# Patient Record
Sex: FEMALE | Race: WHITE | Employment: FULL TIME | URBAN - METROPOLITAN AREA
[De-identification: names, ages, dates, MRNs, and addresses within clinical notes are randomized per-mention and may not be internally consistent; named-entity substitution may affect disease eponyms.]

---
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
## 2021-08-11 ENCOUNTER — HOSPITAL ENCOUNTER (OUTPATIENT)
Dept: LAB | Age: 21
Discharge: HOME | End: 2021-08-11
Payer: COMMERCIAL

## 2021-08-11 ENCOUNTER — OFFICE VISIT (OUTPATIENT)
Dept: FAMILY MEDICINE CLINIC | Age: 21
End: 2021-08-11
Payer: COMMERCIAL

## 2021-08-11 VITALS
BODY MASS INDEX: 20.77 KG/M2 | WEIGHT: 110 LBS | HEART RATE: 96 BPM | SYSTOLIC BLOOD PRESSURE: 103 MMHG | OXYGEN SATURATION: 98 % | HEIGHT: 61 IN | RESPIRATION RATE: 20 BRPM | DIASTOLIC BLOOD PRESSURE: 69 MMHG | TEMPERATURE: 98.3 F

## 2021-08-11 DIAGNOSIS — R78.79 HIGH BLOOD COPPER LEVEL: Primary | ICD-10-CM

## 2021-08-11 DIAGNOSIS — R78.79 HIGH BLOOD COPPER LEVEL: ICD-10-CM

## 2021-08-11 DIAGNOSIS — Z76.89 ENCOUNTER TO ESTABLISH CARE: ICD-10-CM

## 2021-08-11 PROCEDURE — 99214 OFFICE O/P EST MOD 30 MIN: CPT | Performed by: STUDENT IN AN ORGANIZED HEALTH CARE EDUCATION/TRAINING PROGRAM

## 2021-08-11 PROCEDURE — 36415 COLL VENOUS BLD VENIPUNCTURE: CPT

## 2021-08-11 PROCEDURE — 82525 ASSAY OF COPPER: CPT

## 2021-08-11 PROCEDURE — 82390 ASSAY OF CERULOPLASMIN: CPT

## 2021-08-11 NOTE — PROGRESS NOTES
Ninoska Davis presents today for   Chief Complaint   Patient presents with   [facility].       Is someone accompanying this pt? yes    Is the patient using any DME equipment during OV? no    Depression Screening:  3 most recent PHQ Screens 8/11/2021   Little interest or pleasure in doing things Not at all   Feeling down, depressed, irritable, or hopeless Not at all   Total Score PHQ 2 0   In the past year have you felt depressed or sad most days, even if you felt okay? -   Has there been a time in the past month when you have had serious thoughts about ending your life? -   Have you ever in your whole life, tried to kill yourself or made a suicide attempt? -       Learning Assessment:  Learning Assessment 7/26/2018   PRIMARY LEARNER Patient   HIGHEST LEVEL OF EDUCATION - PRIMARY LEARNER  DID NOT GRADUATE HIGH SCHOOL   BARRIERS PRIMARY LEARNER NONE   PRIMARY LANGUAGE ENGLISH   LEARNER PREFERENCE PRIMARY LISTENING     READING     DEMONSTRATION   ANSWERED BY patient   RELATIONSHIP SELF       Abuse Screening:  Abuse Screening Questionnaire 7/26/2018   Do you ever feel afraid of your partner? N   Are you in a relationship with someone who physically or mentally threatens you? N   Is it safe for you to go home? Y       Fall Risk  Fall Risk Assessment, last 12 mths 7/26/2018   Able to walk? Yes   Fall in past 12 months? No       Health Maintenance reviewed and discussed and ordered per Provider. Health Maintenance Due   Topic Date Due    Hepatitis C Screening  Never done    HPV Age 9Y-34Y (1 - 2-dose series) Never done    COVID-19 Vaccine (1) Never done   . Coordination of Care:  1. Have you been to the ER, urgent care clinic since your last visit? Hospitalized since your last visit? no    2. Have you seen or consulted any other health care providers outside of the [address] [place] [address] since your last visit? Include any pap smears or colon screening.  yes      Last  Checked na  Last UDS Checked na  Last Pain contract signed: [email or web address]

## 2021-08-12 LAB — CERULOPLASMIN SERPL-MCNC: 40.1 MG/DL (ref 19–39)

## 2021-08-13 NOTE — PROGRESS NOTES
Valentin Vasquez is a 22 y.o.  female and presents with    Chief Complaint   Patient presents with    Establish Care           Subjective:    Patient is here to establish care. She has been followed by Dr. Deyanira Tolbert, due to tremors. Blood work was ran on patient, and it was noted that ceruplasmin and copper levels were elevated. She was started on zinc therapy. Patient was seeing neurology because of essential tremors. Patient has had blood work done by neurology, is within normal limits liver enzymes, normal lipase, has not done 24-hour urine copper levels. Has not seen ophthalmology. There is no family history of anyone having issues with copper metabolism. Father did die from liver failure. 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    Family History   Problem Relation Age of Onset    Asthma Mother      Social History     Socioeconomic History    Marital status: SINGLE     Spouse name: Not on file    Number of children: Not on file    Years of education: Not on file    Highest education level: Not on file   Occupational History    Not on file   Tobacco Use    Smoking status: Never Smoker    Smokeless tobacco: Never Used   Substance and Sexual Activity    Alcohol use: No    Drug use: No    Sexual activity: Yes     Partners: Male     Birth control/protection: Pill   Other Topics Concern     Service No    Blood Transfusions No    Caffeine Concern No    Occupational Exposure No    Hobby Hazards No    Sleep Concern No    Stress Concern No    Weight Concern No    Special Diet No    Back Care No    Exercise Yes    Bike Helmet No    Seat Belt Yes    Self-Exams Yes   Social History Narrative    Not on file     Social Determinants of Health     Financial Resource Strain:     Difficulty of Paying Living Expenses:    Food Insecurity:     Worried About Running Out of Food in the Last Year:     [address] in the Last Year:    Transportation Needs:     Lack of Transportation (Medical):  Lack of Transportation (Non-Medical):    Physical Activity:     Days of Exercise per Week:     Minutes of Exercise per Session:    Stress:     Feeling of Stress :    Social Connections:     Frequency of Communication with Friends and Family:     Frequency of Social Gatherings with Friends and Family:     Attends Zoroastrianism Services:     Active Member of Clubs or Organizations:     Attends Club or Organization Meetings:     Marital Status:    Intimate Partner Violence:     Fear of Current or Ex-Partner:     Emotionally Abused:     Physically Abused:     Sexually Abused:              ROS   Review of Systems   Constitutional: Negative for chills, fever and malaise/fatigue. HENT: Negative for congestion, ear discharge and ear pain. Eyes: Negative for blurred vision, pain and discharge. Respiratory: Negative for cough and shortness of breath. Cardiovascular: Negative for chest pain and palpitations. Gastrointestinal: Negative for abdominal pain, nausea and vomiting. Genitourinary: Negative for dysuria, frequency and urgency. Skin: Negative for itching and rash. Neurological: Negative for dizziness, seizures, loss of consciousness and headaches. Psychiatric/Behavioral: Negative for substance abuse.            Objective:  Vitals:    08/11/21 0835   BP: 103/69   Pulse: 96   Resp: 20   Temp: 98.3 °F (36.8 °C)   SpO2: 98%   Weight: 110 lb (49.9 kg)   Height: 5' 1\" (1.549 m)   PainSc:   0 - No pain   LMP: 08/07/2021       Physical Exam  Vitals reviewed. Constitutional:       Appearance: Normal appearance. HENT:      Right Ear: Tympanic membrane, ear canal and external ear normal. There is no impacted cerumen. Left Ear: Tympanic membrane, ear canal and external ear normal. There is no impacted cerumen. Nose: Nose normal. No congestion or rhinorrhea. Mouth/Throat:      Mouth: Mucous membranes are dry. Pharynx: Oropharynx is clear. No oropharyngeal exudate or posterior oropharyngeal erythema. Eyes:      General: No scleral icterus. Right eye: No discharge. Left eye: No discharge. Extraocular Movements: Extraocular movements intact. Comments: No Kayser-Fleischer rings appreciated   Cardiovascular:      Rate and Rhythm: Normal rate and regular rhythm. Heart sounds: No murmur heard. Pulmonary:      Effort: Pulmonary effort is normal. No respiratory distress. Breath sounds: Normal breath sounds. No stridor. No wheezing, rhonchi or rales. Chest:      Chest wall: No tenderness. Abdominal:      General: Abdomen is flat. Bowel sounds are normal.      Palpations: Abdomen is soft. Tenderness: There is no abdominal tenderness. Musculoskeletal:         General: No swelling, tenderness, deformity or signs of injury. Normal range of motion. Cervical back: Normal range of motion and neck supple. Right lower leg: No edema. Left lower leg: No edema. Lymphadenopathy:      Cervical: No cervical adenopathy. Skin:     General: Skin is warm and dry. Neurological:      Mental Status: She is alert and oriented to person, place, and time. Cranial Nerves: No cranial nerve deficit. Deep Tendon Reflexes: Reflexes normal.   Psychiatric:         Mood and Affect: Mood normal.         Behavior: Behavior normal.         Thought Content: Thought content normal.           LABS     TESTS      Assessment/Plan:    1.  High blood copper level  Will get copper antiplasmin level, patient is to do copper urine 24-hour collection as well. - REFERRAL TO OPHTHALMOLOGY  - COPPER; Future  - CERULOPLASMIN; Future    2. Encounter to establish care  Will be patient's PCP       Lab review: orders written for new lab studies as appropriate; see orders      I have discussed the diagnosis with the patient and the intended plan as seen in the above orders. The patient has received an after-visit summary and questions were answered concerning future plans. I have discussed medication side effects and warnings with the patient as well. I have reviewed the plan of care with the patient, accepted their input and they are in agreement with the treatment goals.          Maycol Harris MD

## 2021-08-14 LAB — COPPER SERPL-MCNC: 213 UG/DL (ref 80–158)

## 2021-08-15 NOTE — PROGRESS NOTES
Please call patient and let her know that her copper and ceruplasmin levels are lower but are still elevated. Ask her to please do the 24hr urine copper and turn it in. To please call and let us know when she does it.  thanks

## 2021-08-16 ENCOUNTER — TELEPHONE (OUTPATIENT)
Dept: FAMILY MEDICINE CLINIC | Age: 21
End: 2021-08-16

## 2021-08-16 NOTE — TELEPHONE ENCOUNTER
Called patient told lab results and to please do the copper test. Patient states she will do test tomorrow.

## 2021-09-09 DIAGNOSIS — R78.79 HIGH BLOOD COPPER LEVEL: Primary | ICD-10-CM

## 2021-09-09 NOTE — PROGRESS NOTES
Discussed w/ pt that she had normal urine copper level. Burak Mast ruled out. Need to look for further dx. She is to come and have blood work done, and then have a follow up appt. She verbalized understanding.

## 2021-09-20 ENCOUNTER — HOSPITAL ENCOUNTER (OUTPATIENT)
Dept: LAB | Age: 21
Discharge: HOME | End: 2021-09-20
Payer: COMMERCIAL

## 2021-09-20 DIAGNOSIS — R78.79 HIGH BLOOD COPPER LEVEL: ICD-10-CM

## 2021-09-20 LAB
ANION GAP SERPL CALC-SCNC: 7 MMOL/L (ref 3–18)
BASOPHILS # BLD: 0 K/UL (ref 0–0.1)
BASOPHILS NFR BLD: 0 % (ref 0–2)
BUN SERPL-MCNC: 10 MG/DL (ref 7–18)
BUN/CREAT SERPL: 15 (ref 12–20)
CALCIUM SERPL-MCNC: 8.7 MG/DL (ref 8.5–10.1)
CHLORIDE SERPL-SCNC: 108 MMOL/L (ref 100–111)
CO2 SERPL-SCNC: 24 MMOL/L (ref 21–32)
CREAT SERPL-MCNC: 0.66 MG/DL (ref 0.6–1.3)
DIFFERENTIAL METHOD BLD: ABNORMAL
EOSINOPHIL # BLD: 0.3 K/UL (ref 0–0.4)
EOSINOPHIL NFR BLD: 4 % (ref 0–5)
ERYTHROCYTE [DISTWIDTH] IN BLOOD BY AUTOMATED COUNT: 13.7 % (ref 11.6–14.5)
GLUCOSE SERPL-MCNC: 72 MG/DL (ref 74–99)
HCT VFR BLD AUTO: 38.5 % (ref 35–45)
HGB BLD-MCNC: 12.6 G/DL (ref 12–16)
LYMPHOCYTES # BLD: 2.7 K/UL (ref 0.9–3.6)
LYMPHOCYTES NFR BLD: 37 % (ref 21–52)
MCH RBC QN AUTO: 30.7 PG (ref 24–34)
MCHC RBC AUTO-ENTMCNC: 32.7 G/DL (ref 31–37)
MCV RBC AUTO: 93.9 FL (ref 78–100)
MONOCYTES # BLD: 0.4 K/UL (ref 0.05–1.2)
MONOCYTES NFR BLD: 5 % (ref 3–10)
NEUTS SEG # BLD: 3.9 K/UL (ref 1.8–8)
NEUTS SEG NFR BLD: 53 % (ref 40–73)
PLATELET # BLD AUTO: 299 K/UL (ref 135–420)
PMV BLD AUTO: 9.8 FL (ref 9.2–11.8)
POTASSIUM SERPL-SCNC: 4.1 MMOL/L (ref 3.5–5.5)
RBC # BLD AUTO: 4.1 M/UL (ref 4.2–5.3)
SODIUM SERPL-SCNC: 139 MMOL/L (ref 136–145)
WBC # BLD AUTO: 7.3 K/UL (ref 4.6–13.2)

## 2021-09-20 PROCEDURE — 36415 COLL VENOUS BLD VENIPUNCTURE: CPT

## 2021-09-20 PROCEDURE — 82525 ASSAY OF COPPER: CPT

## 2021-09-20 PROCEDURE — 86200 CCP ANTIBODY: CPT

## 2021-09-20 PROCEDURE — 82390 ASSAY OF CERULOPLASMIN: CPT

## 2021-09-20 PROCEDURE — 85025 COMPLETE CBC W/AUTO DIFF WBC: CPT

## 2021-09-20 PROCEDURE — 80048 BASIC METABOLIC PNL TOTAL CA: CPT

## 2021-09-20 PROCEDURE — 84630 ASSAY OF ZINC: CPT

## 2021-09-21 LAB — CERULOPLASMIN SERPL-MCNC: 43.6 MG/DL (ref 19–39)

## 2021-09-26 LAB
COPPER SERPL-MCNC: 222 UG/DL (ref 80–158)
ZINC SERPL-MCNC: 89 UG/DL (ref 44–115)

## 2021-09-30 LAB
14.3.3 ETA, RHEUM. ARTHRITIS: <0.2 NG/ML
CCP IGA+IGG SERPL IA-ACNC: <20 UNITS
RHEUMATOID FACT SERPL-ACNC: <14 UNITS/ML

## 2021-10-22 ENCOUNTER — TELEPHONE (OUTPATIENT)
Dept: FAMILY MEDICINE CLINIC | Age: 21
End: 2021-10-22

## 2021-12-01 ENCOUNTER — OFFICE VISIT (OUTPATIENT)
Dept: FAMILY MEDICINE CLINIC | Age: 21
End: 2021-12-01
Payer: COMMERCIAL

## 2021-12-01 ENCOUNTER — HOSPITAL ENCOUNTER (OUTPATIENT)
Dept: LAB | Age: 21
Discharge: HOME | End: 2021-12-01
Payer: COMMERCIAL

## 2021-12-01 VITALS
RESPIRATION RATE: 20 BRPM | OXYGEN SATURATION: 100 % | WEIGHT: 117.4 LBS | BODY MASS INDEX: 22.18 KG/M2 | DIASTOLIC BLOOD PRESSURE: 75 MMHG | HEART RATE: 82 BPM | SYSTOLIC BLOOD PRESSURE: 108 MMHG | TEMPERATURE: 97.6 F

## 2021-12-01 DIAGNOSIS — R78.79 HIGH BLOOD COPPER LEVEL: ICD-10-CM

## 2021-12-01 DIAGNOSIS — R78.79 HIGH BLOOD COPPER LEVEL: Primary | ICD-10-CM

## 2021-12-01 LAB
ALBUMIN SERPL-MCNC: 3.6 G/DL (ref 3.4–5)
ALBUMIN/GLOB SERPL: 0.9 {RATIO} (ref 0.8–1.7)
ALP SERPL-CCNC: 53 U/L (ref 45–117)
ALT SERPL-CCNC: 13 U/L (ref 13–56)
ANION GAP SERPL CALC-SCNC: 3 MMOL/L (ref 3–18)
AST SERPL-CCNC: 12 U/L (ref 10–38)
BASOPHILS # BLD: 0 K/UL (ref 0–0.1)
BASOPHILS NFR BLD: 0 % (ref 0–2)
BILIRUB SERPL-MCNC: 0.4 MG/DL (ref 0.2–1)
BUN SERPL-MCNC: 13 MG/DL (ref 7–18)
BUN/CREAT SERPL: 22 (ref 12–20)
CALCIUM SERPL-MCNC: 9.1 MG/DL (ref 8.5–10.1)
CHLORIDE SERPL-SCNC: 105 MMOL/L (ref 100–111)
CO2 SERPL-SCNC: 29 MMOL/L (ref 21–32)
CREAT SERPL-MCNC: 0.58 MG/DL (ref 0.6–1.3)
DIFFERENTIAL METHOD BLD: NORMAL
EOSINOPHIL # BLD: 0.3 K/UL (ref 0–0.4)
EOSINOPHIL NFR BLD: 4 % (ref 0–5)
ERYTHROCYTE [DISTWIDTH] IN BLOOD BY AUTOMATED COUNT: 13.4 % (ref 11.6–14.5)
GLOBULIN SER CALC-MCNC: 4.1 G/DL (ref 2–4)
GLUCOSE SERPL-MCNC: 76 MG/DL (ref 74–99)
HCT VFR BLD AUTO: 40.9 % (ref 35–45)
HGB BLD-MCNC: 13.1 G/DL (ref 12–16)
IMM GRANULOCYTES # BLD AUTO: 0 K/UL (ref 0–0.04)
IMM GRANULOCYTES NFR BLD AUTO: 0 % (ref 0–0.5)
LYMPHOCYTES # BLD: 2.2 K/UL (ref 0.9–3.6)
LYMPHOCYTES NFR BLD: 27 % (ref 21–52)
MCH RBC QN AUTO: 30 PG (ref 24–34)
MCHC RBC AUTO-ENTMCNC: 32 G/DL (ref 31–37)
MCV RBC AUTO: 93.6 FL (ref 78–100)
MONOCYTES # BLD: 0.5 K/UL (ref 0.05–1.2)
MONOCYTES NFR BLD: 7 % (ref 3–10)
NEUTS SEG # BLD: 4.9 K/UL (ref 1.8–8)
NEUTS SEG NFR BLD: 61 % (ref 40–73)
NRBC # BLD: 0 K/UL (ref 0–0.01)
NRBC BLD-RTO: 0 PER 100 WBC
PLATELET # BLD AUTO: 315 K/UL (ref 135–420)
PMV BLD AUTO: 9.8 FL (ref 9.2–11.8)
POTASSIUM SERPL-SCNC: 4.1 MMOL/L (ref 3.5–5.5)
PROT SERPL-MCNC: 7.7 G/DL (ref 6.4–8.2)
RBC # BLD AUTO: 4.37 M/UL (ref 4.2–5.3)
SODIUM SERPL-SCNC: 137 MMOL/L (ref 136–145)
WBC # BLD AUTO: 8.1 K/UL (ref 4.6–13.2)

## 2021-12-01 PROCEDURE — 82525 ASSAY OF COPPER: CPT

## 2021-12-01 PROCEDURE — 36415 COLL VENOUS BLD VENIPUNCTURE: CPT

## 2021-12-01 PROCEDURE — 80053 COMPREHEN METABOLIC PANEL: CPT

## 2021-12-01 PROCEDURE — 82390 ASSAY OF CERULOPLASMIN: CPT

## 2021-12-01 PROCEDURE — 99213 OFFICE O/P EST LOW 20 MIN: CPT | Performed by: STUDENT IN AN ORGANIZED HEALTH CARE EDUCATION/TRAINING PROGRAM

## 2021-12-01 PROCEDURE — 85025 COMPLETE CBC W/AUTO DIFF WBC: CPT

## 2021-12-01 NOTE — PROGRESS NOTES
Lizette Reddy is a 22 y.o. female and presents with    Chief Complaint   Patient presents with    Follow-up           Subjective:    Pt is here to f/u on her copper levels. States she continues to take her zinc, when she remembers. She has not seen neurology in a while. States that her HA lately have been better, but a couple of weeks ago they were significant. No other changes per pt. 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    Family History   Problem Relation Age of Onset    Asthma Mother      Social History     Socioeconomic History    Marital status: SINGLE     Spouse name: Not on file    Number of children: Not on file    Years of education: Not on file    Highest education level: Not on file   Occupational History    Not on file   Tobacco Use    Smoking status: Never Smoker    Smokeless tobacco: Never Used   Substance and Sexual Activity    Alcohol use: No    Drug use: No    Sexual activity: Yes     Partners: Male     Birth control/protection: Pill   Other Topics Concern     Service No    Blood Transfusions No    Caffeine Concern No    Occupational Exposure No    Hobby Hazards No    Sleep Concern No    Stress Concern No    Weight Concern No    Special Diet No    Back Care No    Exercise Yes    Bike Helmet No    Seat Belt Yes    Self-Exams Yes   Social History Narrative    Not on file     Social Determinants of Health     Financial Resource Strain:     Difficulty of Paying Living Expenses: Not on file   Food Insecurity:     Worried About Running Out of Food in the Last Year: Not on file    Ran Out of Food in the Last Year: Not on file   Transportation Needs:     Lack of Transportation (Medical): Not on file    Lack of Transportation (Non-Medical): Not on file   Physical Activity:     Days of Exercise per Week: Not on file    Minutes of Exercise per Session: Not on file   Stress:     Feeling of Stress : Not on file   Social Connections:     Frequency of Communication with Friends and Family: Not on file    Frequency of Social Gatherings with Friends and Family: Not on file    Attends Quaker Services: Not on file    Active Member of [address] [place] [address] or Organizations: Not on file    Attends Club or Organization Meetings: Not on file    Marital Status: Not on file   Intimate Partner Violence:     Fear of Current or Ex-Partner: Not on file    Emotionally Abused: Not on file    Physically Abused: Not on file    Sexually Abused: Not on file   Housing Stability:     Unable to Pay for Housing in the Last Year: Not on file    Number of [place] in the Last Year: Not on file    Unstable Housing in the Last Year: Not on file             ROS   Review of Systems   Constitutional: Negative for chills, fever and malaise/fatigue. HENT: Negative for congestion, ear discharge and ear pain. Eyes: Negative for blurred vision, pain and discharge. Respiratory: Negative for cough and shortness of breath. Cardiovascular: Negative for chest pain and palpitations. Gastrointestinal: Negative for abdominal pain, nausea and vomiting. Genitourinary: Negative for dysuria, frequency and urgency. Skin: Negative for itching and rash. Neurological: Positive for headaches. Negative for dizziness, seizures and loss of consciousness. Psychiatric/Behavioral: Negative for substance abuse.            Objective:  Vitals:    12/01/21 0810   BP: 108/75   Pulse: 82   Resp: 20   Temp: 97.6 °F (36.4 °C)   SpO2: 100%   Weight: 117 lb 6.4 oz (53.3 kg) PainSc:   0 - No pain   LMP: 11/29/2021       Physical Exam  Vitals reviewed. Constitutional:       Appearance: Normal appearance. Eyes:      General: No scleral icterus. Right eye: No discharge. Left eye: No discharge. Cardiovascular:      Rate and Rhythm: Normal rate and regular rhythm. Heart sounds: Murmur heard. Pulmonary:      Effort: Pulmonary effort is normal. No respiratory distress. Breath sounds: Normal breath sounds. Abdominal:      General: Abdomen is flat. There is no distension. Palpations: Abdomen is soft. Tenderness: There is abdominal tenderness (RUQ). Musculoskeletal:         General: Normal range of motion. Cervical back: Normal range of motion and neck supple. Skin:     General: Skin is warm and dry. Neurological:      Mental Status: She is alert and oriented to person, place, and time. Cranial Nerves: No cranial nerve deficit. Psychiatric:         Mood and Affect: Mood normal.         Behavior: Behavior normal.         Thought Content: Thought content normal.         Judgment: Judgment normal.           LABS     TESTS      Assessment/Plan:    1. High blood copper level  Called hematology to see if they would follow patient, however, since there is no significant change in her CBC it is not something they would follow. - ECHO ADULT COMPLETE; Future  - CERULOPLASMIN; Future  - COPPER; Future  - METABOLIC PANEL, COMPREHENSIVE; Future  - CBC WITH AUTOMATED DIFF; Future  -  ABD LTD; Future       Lab review: orders written for new lab studies as appropriate; see orders      I have discussed the diagnosis with the patient and the intended plan as seen in the above orders. The patient has received an after-visit summary and questions were answered concerning future plans. I have discussed medication side effects and warnings with the patient as well.  I have reviewed the plan of care with the patient, accepted their input and they are in agreement with the treatment goals.          Marlee Tran MD

## 2021-12-01 NOTE — PROGRESS NOTES
Harshrolando Mckoy presents today for   Chief Complaint   Patient presents with    Follow-up       Is someone accompanying this pt? no    Is the patient using any DME equipment during OV? no    Depression Screening:  3 most recent PHQ Screens 12/1/2021   Little interest or pleasure in doing things Not at all   Feeling down, depressed, irritable, or hopeless Not at all   Total Score PHQ 2 0   In the past year have you felt depressed or sad most days, even if you felt okay? -   Has there been a time in the past month when you have had serious thoughts about ending your life? -   Have you ever in your whole life, tried to kill yourself or made a suicide attempt? -       Learning Assessment:  Learning Assessment 7/26/2018   PRIMARY LEARNER Patient   HIGHEST LEVEL OF EDUCATION - PRIMARY LEARNER  DID NOT GRADUATE HIGH SCHOOL   BARRIERS PRIMARY LEARNER NONE   PRIMARY LANGUAGE ENGLISH   LEARNER PREFERENCE PRIMARY LISTENING     READING     DEMONSTRATION   ANSWERED BY patient   RELATIONSHIP SELF       Abuse Screening:  Abuse Screening Questionnaire 8/11/2021   Do you ever feel afraid of your partner? N   Are you in a relationship with someone who physically or mentally threatens you? N   Is it safe for you to go home? Y       Fall Risk  Fall Risk Assessment, last 12 mths 7/26/2018   Able to walk? Yes   Fall in past 12 months? No       Health Maintenance reviewed and discussed and ordered per Provider. Health Maintenance Due   Topic Date Due    Hepatitis C Screening  Never done    COVID-19 Vaccine (1) Never done    HPV Age 9Y-34Y (1 - 2-dose series) Never done    Flu Vaccine (1) Never done   . Coordination of Care:  1. Have you been to the ER, urgent care clinic since your last visit? Hospitalized since your last visit? no    2. Have you seen or consulted any other health care providers outside of the [address] [place] [address] since your last visit? Include any pap smears or colon screening.  no      Last  Checked na  Last UDS Checked na  Last Pain contract signed: jefry

## 2021-12-02 LAB — CERULOPLASMIN SERPL-MCNC: 38.4 MG/DL (ref 19–39)

## 2021-12-05 LAB — COPPER SERPL-MCNC: 167 UG/DL (ref 80–158)

## 2022-03-18 PROBLEM — R41.3 MEMORY IMPAIRMENT: Status: ACTIVE | Noted: 2018-12-13

## 2022-03-19 PROBLEM — F81.9 LEARNING DISABILITY: Status: ACTIVE | Noted: 2018-07-26

## 2022-03-19 PROBLEM — N92.6 IRREGULAR MENSES: Status: ACTIVE | Noted: 2018-12-13

## 2022-05-17 ENCOUNTER — TELEPHONE (OUTPATIENT)
Dept: FAMILY MEDICINE CLINIC | Age: 22
End: 2022-05-17

## 2022-05-17 DIAGNOSIS — R78.79 HIGH BLOOD COPPER LEVEL: Primary | ICD-10-CM

## 2022-05-17 NOTE — TELEPHONE ENCOUNTER
[facility] vascular called stating they need a new order put in for patients echo. The old one .

## 2022-06-22 ENCOUNTER — TELEPHONE (OUTPATIENT)
Dept: CARDIOLOGY CLINIC | Age: 22
End: 2022-06-22

## 2022-09-19 ENCOUNTER — OFFICE VISIT (OUTPATIENT)
Dept: FAMILY MEDICINE CLINIC | Age: 22
End: 2022-09-19
Payer: MEDICAID

## 2022-09-19 VITALS
TEMPERATURE: 97.9 F | HEIGHT: 61 IN | OXYGEN SATURATION: 99 % | RESPIRATION RATE: 16 BRPM | DIASTOLIC BLOOD PRESSURE: 70 MMHG | WEIGHT: 109.8 LBS | HEART RATE: 82 BPM | BODY MASS INDEX: 20.73 KG/M2 | SYSTOLIC BLOOD PRESSURE: 105 MMHG

## 2022-09-19 DIAGNOSIS — R78.79 HIGH BLOOD COPPER LEVEL: Primary | ICD-10-CM

## 2022-09-19 DIAGNOSIS — R53.1 WEAKNESS: ICD-10-CM

## 2022-09-19 DIAGNOSIS — R25.1 OCCASIONAL TREMORS: ICD-10-CM

## 2022-09-19 DIAGNOSIS — Z23 ENCOUNTER FOR IMMUNIZATION: ICD-10-CM

## 2022-09-19 DIAGNOSIS — Z11.59 NEED FOR HEPATITIS C SCREENING TEST: ICD-10-CM

## 2022-09-19 PROCEDURE — 90686 IIV4 VACC NO PRSV 0.5 ML IM: CPT | Performed by: STUDENT IN AN ORGANIZED HEALTH CARE EDUCATION/TRAINING PROGRAM

## 2022-09-19 PROCEDURE — 99214 OFFICE O/P EST MOD 30 MIN: CPT | Performed by: STUDENT IN AN ORGANIZED HEALTH CARE EDUCATION/TRAINING PROGRAM

## 2022-09-19 NOTE — LETTER
NOTIFICATION RETURN TO WORK / SCHOOL    9/19/2022 8:12 AM    Ms. Magdiel Pineda  [address]      To Whom It May Concern:    Magdiel Pineda is currently under the care of Yina Armenta. She was seen by me in the office today. She will return to work on: 9/20/2022    If there are questions or concerns please have the patient contact our office.         Sincerely,      Kirill Segal MD

## 2022-09-19 NOTE — PROGRESS NOTES
Kathia Herbert is a 25 y.o.  female and presents with    Chief Complaint   Patient presents with    Tremors     Mostly hands Worse last month            Subjective:    Has noted that she has been having tremors, and when this happens her hands get weak. Pt states this is interfering with work, and already had an incident at work. States it was worst a week ago, but she notices that it first starting worse about 1 month ago. She had for 2 weeks it was really bad, and then got better. Tremors were happening when she went to get something, and when she grasp something then her hands would get worse. Has not seen the neurologist that was f/up with. Does not drink alcohol or do drugs. Drinks coffee seldomly. Sleeps about 7 hours a night, but if off she can sleep 15 hours. 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 No pertinent surgical history.    Family History   Problem Relation Age of Onset    Asthma Mother      Social History     Socioeconomic History    Marital status: SINGLE     Spouse name: Not on file    Number of children: Not on file    Years of education: Not on file    Highest education level: Not on file   Occupational History    Not on file   Tobacco Use    Smoking status: Never    Smokeless tobacco: Never   Substance and Sexual Activity    Alcohol use: No    Drug use: No    Sexual activity: Yes     Partners: Male     Birth control/protection: Pill   Other Topics Concern     Service No    Blood Transfusions No    Caffeine Concern No    Occupational Exposure No    Hobby Hazards No    Sleep Concern No    Stress Concern No    Weight Concern No    Special Diet No    Back Care No    Exercise Yes    Bike Helmet No    Seat Belt Yes    Self-Exams Yes   Social History Narrative    Not on file     Social Determinants of Health     Financial Resource Strain: Not on file   Food Insecurity: Not on file   Transportation Needs: Not on file   Physical Activity: Not on file   Stress: Not on file   Social Connections: Not on file   Intimate Partner Violence: Not on file   Housing Stability: Not on file             ROS   Review of Systems   Constitutional:  Negative for chills, fever and malaise/fatigue. HENT:  Negative for congestion, ear discharge and ear pain. Eyes:  Negative for blurred vision, pain and discharge. Respiratory:  Negative for cough and shortness of breath. Cardiovascular:  Negative for chest pain and palpitations. Gastrointestinal:  Negative for abdominal pain, nausea and vomiting. Genitourinary:  Negative for dysuria, frequency and urgency. Skin:  Negative for itching and rash. Neurological:  Positive for tremors. Negative for dizziness, seizures, loss of consciousness and headaches. Psychiatric/Behavioral:  Negative for substance abuse. Objective:  Vitals:    09/19/22 0800   BP: 105/70   Pulse: 82   Resp: 16   Temp: 97.9 °F (36.6 °C)   TempSrc: Temporal   SpO2: 99%   Weight: 109 lb 12.8 oz (49.8 kg)   Height: 5' 1\" (1.549 m)   PainSc:   0 - No pain   LMP: 09/05/2022       Physical Exam  Vitals reviewed. Constitutional:       Appearance: Normal appearance. Eyes:      General: No scleral icterus. Right eye: No discharge. Left eye: No discharge. Cardiovascular:      Rate and Rhythm: Normal rate and regular rhythm. Pulmonary:      Effort: Pulmonary effort is normal. No respiratory distress.    Musculoskeletal:      Cervical back: Normal range of motion and neck supple. Neurological:      Mental Status: She is alert and oriented to person, place, and time. Cranial Nerves: No cranial nerve deficit. Motor: Tremor (RUE on intention) present. Coordination: Finger-Nose-Finger Test and Heel to [employer] normal.   Psychiatric:         Mood and Affect: Mood normal.         Behavior: Behavior normal.         Thought Content: Thought content normal.         Judgment: Judgment normal.       LABS     TESTS      Assessment/Plan:    1. High blood copper level  - COPPER; Future  - CERULOPLASMIN; Future  - ZINC; Future  - US LIVER; Future    2. Occasional tremors  - METABOLIC PANEL, COMPREHENSIVE; Future  - CBC WITH AUTOMATED DIFF; Future  - TSH 3RD GENERATION; Future  - URINALYSIS W/MICROSCOPIC; Future    3. Need for hepatitis C screening test  - HEPATITIS C AB; Future    4. Encounter for immunization  - INFLUENZA, FLUARIX, FLULAVAL, FLUZONE (AGE 6 MO+), AFLURIA(AGE 3Y+) IM, PF, 0.5 ML    5. Weakness  - CK; Future      Lab review: orders written for new lab studies as appropriate; see orders    Over 30 mins spent on appt and medical management      I have discussed the diagnosis with the patient and the intended plan as seen in the above orders. The patient has received an after-visit summary and questions were answered concerning future plans. I have discussed medication side effects and warnings with the patient as well. I have reviewed the plan of care with the patient, accepted their input and they are in agreement with the treatment goals.          Caro Wiggins MD

## 2022-09-19 NOTE — PROGRESS NOTES
Дмитрий Ma is a 25 y.o. presents today for   Chief Complaint   Patient presents with    Tremors     Mostly hands Worse last month      Is someone accompanying this pt? No    Is the patient using any DME equipment during OV? No    Depression Screening:   3 most recent PHQ Screens 9/19/2022   Little interest or pleasure in doing things Not at all   Feeling down, depressed, irritable, or hopeless Not at all   Total Score PHQ 2 0   In the past year have you felt depressed or sad most days, even if you felt okay? -   Has there been a time in the past month when you have had serious thoughts about ending your life? -   Have you ever in your whole life, tried to kill yourself or made a suicide attempt? -       Health Maintenance: reviewed and discussed and ordered per Provider. Health Maintenance Due   Topic Date Due    Hepatitis C Screening  Never done    COVID-19 Vaccine (1) Never done    HPV Age 9Y-34Y (1 - 2-dose series) Never done    Pap Smear  Never done    Flu Vaccine (1) Never done         Coordination of Care:   1. \"Have you been to the ER, urgent care clinic since your last visit? Hospitalized since your last visit? \" Yes [facility] for bronchospams    2. \"Have you seen or consulted any other health care providers outside of the [address] [place] [address] since your last visit? \" No     3. For patients aged 39-70: Has the patient had a colonoscopy / FIT/ Cologuard? NA - based on age    If the patient is female:    4. For patients aged 41-77: Has the patient had a mammogram within the past 2 years? NA - based on age or sex    11. For patients aged 21-65: Has the patient had a pap smear?  Yes, last year  [address] [place] [address]

## 2022-09-21 LAB
ALBUMIN SERPL-MCNC: 4.2 G/DL (ref 3.9–5)
ALBUMIN/GLOB SERPL: 1.3 {RATIO} (ref 1.2–2.2)
ALP SERPL-CCNC: 55 IU/L (ref 44–121)
ALT SERPL-CCNC: 5 IU/L (ref 0–32)
APPEARANCE UR: CLEAR
AST SERPL-CCNC: 11 IU/L (ref 0–40)
BACTERIA #/AREA URNS HPF: NORMAL /[HPF]
BASOPHILS # BLD AUTO: 0 X10E3/UL (ref 0–0.2)
BASOPHILS NFR BLD AUTO: 0 %
BILIRUB SERPL-MCNC: 0.3 MG/DL (ref 0–1.2)
BILIRUB UR QL STRIP: NEGATIVE
BUN SERPL-MCNC: 11 MG/DL (ref 6–20)
BUN/CREAT SERPL: 19 (ref 9–23)
CALCIUM SERPL-MCNC: 9.3 MG/DL (ref 8.7–10.2)
CASTS URNS QL MICRO: NORMAL /LPF
CERULOPLASMIN SERPL-MCNC: 48.2 MG/DL (ref 19–39)
CHLORIDE SERPL-SCNC: 102 MMOL/L (ref 96–106)
CK SERPL-CCNC: 58 U/L (ref 32–182)
CO2 SERPL-SCNC: 21 MMOL/L (ref 20–29)
COLOR UR: YELLOW
COPPER SERPL-MCNC: 204 UG/DL (ref 80–158)
CREAT SERPL-MCNC: 0.59 MG/DL (ref 0.57–1)
EGFR: 131 ML/MIN/1.73
EOSINOPHIL # BLD AUTO: 0.2 X10E3/UL (ref 0–0.4)
EOSINOPHIL NFR BLD AUTO: 4 %
EPI CELLS #/AREA URNS HPF: NORMAL /HPF (ref 0–10)
ERYTHROCYTE [DISTWIDTH] IN BLOOD BY AUTOMATED COUNT: 13.5 % (ref 11.7–15.4)
GLOBULIN SER CALC-MCNC: 3.2 G/DL (ref 1.5–4.5)
GLUCOSE SERPL-MCNC: 81 MG/DL (ref 65–99)
GLUCOSE UR QL STRIP: NEGATIVE
HCT VFR BLD AUTO: 39.4 % (ref 34–46.6)
HCV AB S/CO SERPL IA: <0.1 S/CO RATIO (ref 0–0.9)
HGB BLD-MCNC: 13.1 G/DL (ref 11.1–15.9)
HGB UR QL STRIP: NEGATIVE
IMM GRANULOCYTES # BLD AUTO: 0 X10E3/UL (ref 0–0.1)
IMM GRANULOCYTES NFR BLD AUTO: 1 %
KETONES UR QL STRIP: NEGATIVE
LEUKOCYTE ESTERASE UR QL STRIP: NEGATIVE
LYMPHOCYTES # BLD AUTO: 1.8 X10E3/UL (ref 0.7–3.1)
LYMPHOCYTES NFR BLD AUTO: 33 %
MCH RBC QN AUTO: 31.6 PG (ref 26.6–33)
MCHC RBC AUTO-ENTMCNC: 33.2 G/DL (ref 31.5–35.7)
MCV RBC AUTO: 95 FL (ref 79–97)
MICRO URNS: NORMAL
MICRO URNS: NORMAL
MONOCYTES # BLD AUTO: 0.4 X10E3/UL (ref 0.1–0.9)
MONOCYTES NFR BLD AUTO: 8 %
NEUTROPHILS # BLD AUTO: 2.9 X10E3/UL (ref 1.4–7)
NEUTROPHILS NFR BLD AUTO: 54 %
NITRITE UR QL STRIP: NEGATIVE
PH UR STRIP: 5.5 [PH] (ref 5–7.5)
PLATELET # BLD AUTO: 361 X10E3/UL (ref 150–450)
POTASSIUM SERPL-SCNC: 4.1 MMOL/L (ref 3.5–5.2)
PROT SERPL-MCNC: 7.4 G/DL (ref 6–8.5)
PROT UR QL STRIP: NEGATIVE
RBC # BLD AUTO: 4.14 X10E6/UL (ref 3.77–5.28)
RBC #/AREA URNS HPF: NORMAL /HPF (ref 0–2)
SODIUM SERPL-SCNC: 140 MMOL/L (ref 134–144)
SP GR UR STRIP: 1.03 (ref 1–1.03)
TSH SERPL DL<=0.005 MIU/L-ACNC: 1.64 UIU/ML (ref 0.45–4.5)
UROBILINOGEN UR STRIP-MCNC: 0.2 MG/DL (ref 0.2–1)
WBC # BLD AUTO: 5.4 X10E3/UL (ref 3.4–10.8)
WBC #/AREA URNS HPF: NORMAL /HPF (ref 0–5)
ZINC SERPL-MCNC: 77 UG/DL (ref 44–115)

## 2022-10-01 DIAGNOSIS — M67.40 GANGLION CYST: Primary | ICD-10-CM

## 2022-10-04 ENCOUNTER — HOSPITAL ENCOUNTER (OUTPATIENT)
Dept: ULTRASOUND IMAGING | Age: 22
Discharge: HOME | End: 2022-10-04
Attending: STUDENT IN AN ORGANIZED HEALTH CARE EDUCATION/TRAINING PROGRAM
Payer: MEDICAID

## 2022-10-04 DIAGNOSIS — R78.79 HIGH BLOOD COPPER LEVEL: ICD-10-CM

## 2022-10-04 PROCEDURE — 76705 ECHO EXAM OF ABDOMEN: CPT

## 2022-10-18 ENCOUNTER — TELEPHONE (OUTPATIENT)
Dept: CARDIOLOGY CLINIC | Age: 22
End: 2022-10-18

## 2022-11-04 ENCOUNTER — OFFICE VISIT (OUTPATIENT)
Dept: ORTHOPEDIC SURGERY | Age: 22
End: 2022-11-04
Payer: MEDICAID

## 2022-11-04 VITALS — BODY MASS INDEX: 21.34 KG/M2 | WEIGHT: 113 LBS | HEIGHT: 61 IN

## 2022-11-04 DIAGNOSIS — M67.432 GANGLION CYST OF DORSUM OF LEFT WRIST: Primary | ICD-10-CM

## 2022-11-04 PROCEDURE — 99204 OFFICE O/P NEW MOD 45 MIN: CPT | Performed by: ORTHOPAEDIC SURGERY

## 2022-11-04 PROCEDURE — 73110 X-RAY EXAM OF WRIST: CPT | Performed by: ORTHOPAEDIC SURGERY

## 2022-11-04 NOTE — LETTER
11/4/2022    Patient: Joe Hankins   YOB: 2000   Date of Visit: 11/4/2022     Omar Peterson MD  [address] [address]  [facility]    Dear Omar Peterson MD,      Thank you for referring Ms. Rohan Villavicencio to [address] [place] [address] for evaluation. My notes for this consultation are attached. If you have questions, please do not hesitate to call me. I look forward to following your patient along with you.       Sincerely,    Yaz Breaux, DO

## 2022-11-04 NOTE — PROGRESS NOTES
Parul Quiñones is a 25 y.o. female right handed . Worker's Compensation and legal considerations: none filed. Vitals:    11/04/22 0900   Weight: 113 lb (51.3 kg)   Height: 5' 1\" (1.549 m)   PainSc:   2   LMP: 11/02/2022           Chief Complaint   Patient presents with    Wrist Pain         HPI: Patient presents today with complaints of left wrist pain and swelling with a bump over the back of the wrist.    Date of onset: Approximately September 2022    Injury: No    Prior Treatment:  No    Numbness/ Tingling: No      ROS: Review of Systems - General ROS: negative  Psychological ROS: negative  ENT ROS: negative  Allergy and Immunology ROS: negative  Hematological and Lymphatic ROS: negative  Respiratory ROS: no cough, shortness of breath, or wheezing  Cardiovascular ROS: no chest pain or dyspnea on exertion  Gastrointestinal ROS: no abdominal pain, change in bowel habits, or black or bloody stools  Musculoskeletal ROS: negative  Neurological ROS: negative  Dermatological ROS: negative    Past Medical History:   Diagnosis Date    Essential tremor     Heartburn     Idiopathic hypersomnia with long sleep time     Memory impairment     Right knee pain        No past surgical history on file. Allergies   Allergen Reactions    Other Food Swelling     Plum      Cat Dander Swelling    Prunes Swelling           PE:     Physical Exam  Vitals and nursing note reviewed. Constitutional:       General: She is not in acute distress. Appearance: Normal appearance. She is not ill-appearing. Cardiovascular:      Pulses: Normal pulses. Pulmonary:      Effort: Pulmonary effort is normal. No respiratory distress. Musculoskeletal:         General: Swelling and tenderness present. No deformity or signs of injury. Normal range of motion. Cervical back: Normal range of motion and neck supple. Right lower leg: No edema. Left lower leg: No edema.    Skin:     General: Skin is warm and dry. Capillary Refill: Capillary refill takes less than 2 seconds. Findings: No bruising or erythema. Neurological:      General: No focal deficit present. Mental Status: She is alert and oriented to person, place, and time. Psychiatric:         Mood and Affect: Mood normal.         Behavior: Behavior normal.          Wrist:    Tenderness L R Test L R   1st Ext Comp - - Finkelstein's - -   Snuff Box - - Younger - -   2nd Ext Comp - - S-L Shear - -   S-L Joint - - L-T Shear - -   L-T Joint - - DRUJ Sup - -   6th Ext Comp - - DRUJ Pro - -   Ulnar Snuff - - DRUJ Grind - -   Fovea - - TFCC - -   STT Joint - - Mid-Carp Inst - -   FCR - - P-T Grind - -   Intersection - - ECU Sublux. - -      Dorsal Ganglion: + 1cm   Volar Ganglion: -      ROM: Full        Imagin/4/2022 3 views of left wrist is negative for any fracture, dislocation, or any other osseous abnormalities. ICD-10-CM ICD-9-CM    1. Ganglion cyst of dorsum of left wrist  M67.432 727.41 AMB POC XRAY, WRIST; COMPLETE, 3+ VIE      SCHEDULE SURGERY            Plan:     Schedule left wrist dorsal ganglion cyst excision    45 minutes was spent discussing operative versus nonoperative treatment, reviewing x-rays, and answering all questions, and postoperative convalescence. Follow-up and Dispositions    Return for H&P.           Plan was reviewed with patient, who verbalized agreement and understanding of the plan

## 2022-11-10 ENCOUNTER — TELEPHONE (OUTPATIENT)
Dept: CARDIOLOGY CLINIC | Age: 22
End: 2022-11-10

## 2022-12-02 ENCOUNTER — OFFICE VISIT (OUTPATIENT)
Dept: ORTHOPEDIC SURGERY | Age: 22
End: 2022-12-02

## 2022-12-02 VITALS
SYSTOLIC BLOOD PRESSURE: 115 MMHG | DIASTOLIC BLOOD PRESSURE: 58 MMHG | OXYGEN SATURATION: 99 % | HEIGHT: 61 IN | WEIGHT: 115 LBS | BODY MASS INDEX: 21.71 KG/M2 | HEART RATE: 66 BPM

## 2022-12-02 DIAGNOSIS — M67.432 GANGLION CYST OF DORSUM OF LEFT WRIST: Primary | ICD-10-CM

## 2022-12-02 NOTE — PROGRESS NOTES
Sara Pablo is a 25 y.o. female right handed . Worker's Compensation and legal considerations: none filed. Vitals:    12/02/22 1032   BP: (!) 115/58   Pulse: 66   SpO2: 99%   Weight: 115 lb (52.2 kg)   Height: 5' 1\" (1.549 m)   PainSc:   0 - No pain   PainLoc: Wrist   LMP: 11/02/2022           Chief Complaint   Patient presents with    Wrist Pain     Left wrist pain       HPI: Patient presents today for preoperative visit as she is scheduled for a left wrist dorsal ganglion excision next week. Initial HPI: Patient presents today with complaints of left wrist pain and swelling with a bump over the back of the wrist.    Date of onset: Approximately September 2022    Injury: No    Prior Treatment:  No    Numbness/ Tingling: No      ROS: Review of Systems - General ROS: negative  Psychological ROS: negative  ENT ROS: negative  Allergy and Immunology ROS: negative  Hematological and Lymphatic ROS: negative  Respiratory ROS: no cough, shortness of breath, or wheezing  Cardiovascular ROS: no chest pain or dyspnea on exertion  Gastrointestinal ROS: no abdominal pain, change in bowel habits, or black or bloody stools  Musculoskeletal ROS: negative  Neurological ROS: negative  Dermatological ROS: negative    Past Medical History:   Diagnosis Date    Essential tremor     Heartburn     Idiopathic hypersomnia with long sleep time     Memory impairment     Right knee pain        No past surgical history on file. Allergies   Allergen Reactions    Other Food Swelling     Plum      Cat Dander Swelling    Prunes Swelling           PE:     Physical Exam  Vitals and nursing note reviewed. Constitutional:       General: She is not in acute distress. Appearance: Normal appearance. She is not ill-appearing, toxic-appearing or diaphoretic. HENT:      Head: Normocephalic and atraumatic. Nose: Nose normal.   Eyes:      Extraocular Movements: Extraocular movements intact.       Pupils: Pupils are equal, round, and reactive to light. Cardiovascular:      Pulses: Normal pulses. Pulmonary:      Effort: Pulmonary effort is normal. No respiratory distress. Abdominal:      General: Abdomen is flat. There is no distension. Musculoskeletal:         General: Swelling and tenderness present. No deformity or signs of injury. Normal range of motion. Cervical back: Normal range of motion and neck supple. Right lower leg: No edema. Left lower leg: No edema. Skin:     General: Skin is warm and dry. Capillary Refill: Capillary refill takes less than 2 seconds. Findings: No bruising or erythema. Neurological:      General: No focal deficit present. Mental Status: She is alert and oriented to person, place, and time. Psychiatric:         Mood and Affect: Mood normal.         Behavior: Behavior normal.          Wrist:    Tenderness L R Test L R   1st Ext Comp - - Finkelstein's - -   Snuff Box - - Younger - -   2nd Ext Comp - - S-L Shear - -   S-L Joint - - L-T Shear - -   L-T Joint - - DRUJ Sup - -   6th Ext Comp - - DRUJ Pro - -   Ulnar Snuff - - DRUJ Grind - -   Fovea - - TFCC - -   STT Joint - - Mid-Carp Inst - -   FCR - - P-T Grind - -   Intersection - - ECU Sublux. - -      Dorsal Ganglion: + 1cm   Volar Ganglion: -      ROM: Full        Imagin/4/2022 3 views of left wrist is negative for any fracture, dislocation, or any other osseous abnormalities. ICD-10-CM ICD-9-CM    1. Ganglion cyst of dorsum of left wrist  M67.432 727.41             Plan:     Proceed as scheduled for left wrist dorsal ganglion cyst excision    The patient was counseled at length about the risks of kevin Covid-19 during their perioperative period and any recovery window from their procedure. The patient was made aware that kevin Covid-19  may worsen their prognosis for recovering from their procedure and lend to a higher morbidity and/or mortality risk.   All material risks, benefits, and reasonable alternatives including postponing the procedure were discussed. The patient does  wish to proceed with the procedure at this time. This procedure has been fully reviewed with the patient and written informed consent has been obtained. Follow-up and Dispositions    Return for postop.           Plan was reviewed with patient, who verbalized agreement and understanding of the plan

## 2022-12-02 NOTE — LETTER
12/2/2022    Patient: Gerardo Ngo   YOB: 2000   Date of Visit: 12/2/2022     Kalen Phelps MD  [address] [phone]  [facility]    Dear Kalen Phelps MD,      Thank you for referring Ms. Kylah Starks to [address] [place] [address] for evaluation. My notes for this consultation are attached. If you have questions, please do not hesitate to call me. I look forward to following your patient along with you.       Sincerely,    Khloe Kim, DO

## 2022-12-07 DIAGNOSIS — M67.432 GANGLION CYST OF DORSUM OF LEFT WRIST: Primary | ICD-10-CM

## 2022-12-07 RX ORDER — DICLOFENAC SODIUM 75 MG/1
75 TABLET, DELAYED RELEASE ORAL 2 TIMES DAILY WITH MEALS
Qty: 20 TABLET | Refills: 0 | Status: SHIPPED | OUTPATIENT
Start: 2022-12-07 | End: 2022-12-17

## 2022-12-07 RX ORDER — HYDROCODONE BITARTRATE AND ACETAMINOPHEN 7.5; 325 MG/1; MG/1
1 TABLET ORAL
Qty: 20 TABLET | Refills: 0 | Status: SHIPPED | OUTPATIENT
Start: 2022-12-07 | End: 2022-12-12

## 2022-12-16 ENCOUNTER — TELEPHONE (OUTPATIENT)
Dept: ORTHOPEDIC SURGERY | Age: 22
End: 2022-12-16

## 2022-12-16 ENCOUNTER — OFFICE VISIT (OUTPATIENT)
Dept: ORTHOPEDIC SURGERY | Age: 22
End: 2022-12-16
Payer: MEDICAID

## 2022-12-16 VITALS — BODY MASS INDEX: 21.34 KG/M2 | HEIGHT: 61 IN | WEIGHT: 113 LBS

## 2022-12-16 DIAGNOSIS — M67.432 GANGLION CYST OF DORSUM OF LEFT WRIST: Primary | ICD-10-CM

## 2022-12-16 PROCEDURE — 99024 POSTOP FOLLOW-UP VISIT: CPT | Performed by: ORTHOPAEDIC SURGERY

## 2022-12-16 NOTE — TELEPHONE ENCOUNTER
Post op patient called for Luke Bowles. Patient said that her Occupational Therapy appt for her Left Wrist is not unitl 01/04/23. Patient said that Dr. Graciela Edge has her out of work until 12/30/22. Patient would like a Note for when she returns to work after 12/30/22, since she is not having therapy on her wrist until 01/04/23. Patient would like the note to be for Light Duty. Patient said she could print up the note from [employer]. Patient tel. [phone].

## 2022-12-16 NOTE — LETTER
NOTIFICATION RETURN TO WORK    12/16/2022 2:33 PM    Ms. Swetha Ford  [address] [facility] [address] [facility]      To Whom It May Concern:    Swetha Ford is currently under the care of [address] [place] [address] [place]. She will remain out of work from 12/7/2022 until 12/30/2022. Please allow patient to work on light duty status from 12/30/2022 until 1/16/2023. If there are questions or concerns please have the patient contact our office.         Sincerely,      Earle Graves, DO

## 2022-12-16 NOTE — TELEPHONE ENCOUNTER
Please supply with note for light duty with no lifting beyond 20 lbs and no push or pull for 2 weeks after returning to work.

## 2022-12-16 NOTE — LETTER
NOTIFICATION RETURN TO WORK    12/16/2022 8:31 AM    Ms. La Espana  [address]      To Whom It May Concern:    La Espana is currently under the care of [address] [place] [address]. She will remain out of work from 12/7/2022 until 12/30/2022. If there are questions or concerns please have the patient contact our office.         Sincerely,      Garrel Slot, DO

## 2022-12-16 NOTE — PROGRESS NOTES
Christine Martinez is a 25 y.o. female right handed . Worker's Compensation and legal considerations: none filed. Vitals:    12/16/22 0816   Weight: 113 lb (51.3 kg)   Height: 5' 1\" (1.549 m)   PainSc:   6   PainLoc: Wrist           Chief Complaint   Patient presents with    Wrist Pain     Left hand pain       HPI: Patient presents today for first postoperative appointment status post left wrist dorsal ganglion cyst excision. She reports significant pain in the wrist especially with trying to flex the wrist.    12/2/2022 HPI: Patient presents today for preoperative visit as she is scheduled for a left wrist dorsal ganglion excision next week. Initial HPI: Patient presents today with complaints of left wrist pain and swelling with a bump over the back of the wrist.    Date of onset: Approximately September 2022    Injury: No    Prior Treatment:  left wrist dorsal ganglion cyst excision    Numbness/ Tingling: No      ROS: Review of Systems - General ROS: negative  Psychological ROS: negative  ENT ROS: negative  Allergy and Immunology ROS: negative  Hematological and Lymphatic ROS: negative  Respiratory ROS: no cough, shortness of breath, or wheezing  Cardiovascular ROS: no chest pain or dyspnea on exertion  Gastrointestinal ROS: no abdominal pain, change in bowel habits, or black or bloody stools  Musculoskeletal ROS: negative  Neurological ROS: negative  Dermatological ROS: negative    Past Medical History:   Diagnosis Date    Essential tremor     Heartburn     Idiopathic hypersomnia with long sleep time     Memory impairment     Right knee pain        No past surgical history on file. Allergies   Allergen Reactions    Other Food Swelling     Plum      Cat Dander Swelling    Prunes Swelling           PE:     Physical Exam  Vitals and nursing note reviewed. Constitutional:       General: She is not in acute distress. Appearance: Normal appearance.  She is not ill-appearing, toxic-appearing or diaphoretic. Cardiovascular:      Pulses: Normal pulses. Pulmonary:      Effort: Pulmonary effort is normal. No respiratory distress. Abdominal:      General: Abdomen is flat. There is no distension. Musculoskeletal:         General: Swelling and tenderness present. No deformity or signs of injury. Cervical back: Normal range of motion and neck supple. Right lower leg: No edema. Left lower leg: No edema. Skin:     General: Skin is warm and dry. Capillary Refill: Capillary refill takes less than 2 seconds. Findings: No bruising or erythema. Neurological:      General: No focal deficit present. Mental Status: She is alert and oriented to person, place, and time. Psychiatric:         Mood and Affect: Mood normal.         Behavior: Behavior normal.          Left wrist: Incision clean dry and intact with no drainage breakdown erythema or any signs of infection. Neurovascularly intact distally. Range of motion limited to only extension but pain with flexion. Pathology is still pending. Imagin/4/2022 3 views of left wrist is negative for any fracture, dislocation, or any other osseous abnormalities. ICD-10-CM ICD-9-CM    1. Ganglion cyst of dorsum of left wrist  M67.432 727.41 REFERRAL TO OCCUPATIONAL THERAPY            Plan:     Referral to Occupational Therapy for range of motion exercises, edema control, and other modalities. We have reached out to the surgery center regarding the pathology report and they will get back to us. Follow-up and Dispositions    Return if symptoms worsen or fail to improve.           Plan was reviewed with patient, who verbalized agreement and understanding of the plan

## 2022-12-16 NOTE — LETTER
12/16/2022    Patient: Sergio Ruiz   YOB: 2000   Date of Visit: 12/16/2022     Michael Olvera MD  [address] [id number]  [facility]    Dear Michael Olvera MD,      Thank you for referring Ms. Jennifer Barreto to [address] [place] [address] for evaluation. My notes for this consultation are attached. If you have questions, please do not hesitate to call me. I look forward to following your patient along with you.       Sincerely,    Chris Marquez, DO

## 2023-01-04 ENCOUNTER — HOSPITAL ENCOUNTER (OUTPATIENT)
Dept: PHYSICAL THERAPY | Age: 23
Discharge: HOME | End: 2023-01-04
Payer: MEDICAID

## 2023-01-04 PROCEDURE — 97165 OT EVAL LOW COMPLEX 30 MIN: CPT

## 2023-01-04 NOTE — PROGRESS NOTES
OCCUPATIONAL THERAPY - DAILY TREATMENT NOTE    Patient Name: Tay Brown        Date: 2023  : 2000   YES Patient  Verified  Visit #:      8  Insurance: Payor: Estefani Blackmon / Plan: [address] [place] [address] / Product Type: Managed Care Medicaid /      In time: 12:33 Out time: 1:09   Total Treatment Time: 36       TREATMENT AREA =  left wrist pain    SUBJECTIVE    Pain Level (on 0 to 10 scale):  2  / 10   Medication Changes/New allergies or changes in medical history, any new surgeries or procedures? NO    If yes, update Summary List   Subjective Functional Status/Changes:  []  No changes reported     \"It gets swollen after work. \"       OBJECTIVE     min Patient Education:  YES  Reviewed HEP   []  Progressed/Changed HEP based on:   Education on post-op care, scar tissue, edema control     Other Objective/Functional Measures:    Edema: Circumference           Right                Left              Styolid Level                14.2 cm           14.4 cm                Scar/Wound: size, color, drainage, adhesions, location: scar dorsum of left wrist, no signs of infection, one stitch stil claudia tact     Sensation: pt reports no sensation deficits. Range of Motion:               Active       Norms Right Left   Forearm Supination 0-80 WFL WFL     Pronation 0-80 WFL WFL   Wrist Flex 0-80 92 28     Ext 0-70 75 35     Ulnar Dev 0-30 48 27     Radial Dev 0-20 28 17         Hand ROM: Pt able to make full composite fist bilaterally.      Hand Strength: not appropriate for testing at this time    Gross Grasp 3pt Pinch Lateral Pinch Tip Pinch   Right  NT NT NT NT   Left NT NT NT NT      Nine-Hole Peg Test:  Left= _22_seconds                  Right=_14_seconds  Finger Opposition:  WFL     Palpation: Tenderness along incision site with slight pressure      ADLs  Feeding:                []MaxA   []ModA   []Angelina   [] CGA   []SBA   []Huber   [x]Independent  UE Dressing:         []MaxA []ModA   [x]Angelina   [] CGA   []SBA   []Huber   []Independent  *long sleeves, zippers, bras  LE Dressing:         []MaxA   []ModA   [x]Angelina   [] CGA   []SBA   []Huber   []Independent  *pulling up pants, buttons  Grooming:             []MaxA   []ModA   [x]Angelina   [] CGA   []SBA   []Huber   []Independent  *hair  Toileting:               []MaxA   []ModA   []Angelina   [] CGA   []SBA   []Huber   [x]Independent  Bathing:                 []MaxA   [x]ModA   []Angelina   [] CGA   []SBA   []Huber   []Independent  *shampooing conditioning hair, washing left side  Light Meal Prep:    []MaxA   [x]ModA   []Angelina   [] CGA   []SBA   []Huber   []Independent  *jars, heavy pots, food packages, lifting plates   Household/Other: []MaxA   []ModA   [x]Angelina   [] CGA   []SBA   []Huber   []Independent  *laundry, vacuuming right handed  Driving:                  []MaxA   []ModA   []Angelina   [] CGA   []SBA   []Huber   []Independent   *pt was beginning to learning how to drive prior to surgery        *Other: Job- push heavy carts, lifting mattresses, changing pillow cases                                                FOTO: NA   Post Treatment Pain Level (on 0 to 10) scale:   2  / 10     ASSESSMENT  Assessment/Changes in Function:   Based on objective measurements, pt with a decrease in AROM of the left wrist in all planes compared to right. Strength testing not appropriate at this time. Pt's FM skilled are [facility]. Pt would benefit from continued OT skilled services to address functional deficits, promoting increased independence and allow pt to return to [facility].         Evaluation Complexity: History LOW Complexity : Brief history review  Examination MEDIUM Complexity : 3-5 performance deficits relating to physical, cognitive , or psychosocial skils that result in activity limitations and / or participation restrictions Clinical Decision Making LOW Complexity : No comorbidities that affect functional and no verbal or physical assistance needed to complete eval tasks Overall Complexity Rating: LOW      []  See Progress Note/Recertification   Patient will continue to benefit from skilled OT services to modify and progress therapeutic interventions, address functional mobility deficits, address ROM deficits, address strength deficits, analyze and address soft tissue restrictions, analyze and cue movement patterns, and instruct in home and community integration to attain remaining goals. Progress toward goals / Updated goals:    Short Term Goals: To be accomplished in 2 weeks:  Patient will be independent in HEP for left wrist/hand ROM/stretches/strengthening to increase ROM and strength for ADL/IADL tasks. Status at Eval: not initiated     Patient will be independent in demonstrating and performing activity modification strategies to aid in success for work, self-care, meal preparation and home management tasks. Status at Eval: not initiated     Patient will be independent in demonstrating and performing edema and scar management strategies to promote healing and improve participation in ADLs/IADLs. Status at Eval: pt educated on scar massage and edema control        Long Term Goals: To be accomplished in 4 weeks  Patient will be able to move the left wrist and hand freely through all planes of motion with no reported pain to increase participation in work tasks. Status at Eval: discomfort with mobility     Patient will increase left wrist flexion to 65 degrees and left wrist extension to 55 degrees or greater to improve performance in ADLs/IADLs. Status at Eval: flexion= 28 degrees, extension= 35 degrees     Patient will increase left wrist ulnar deviation by 10 degrees and left wrist radial deviation by 5 degrees or greater to improve pt's ability to wash her hair.    Status at Eval: ulnar deviation= 27 degrees, radial deviation= 17 degrees     PLAN  []  Upgrade activities as tolerated YES Continue plan of care   []  Discharge due to :    []  Other: Therapist: Lenny Brenner OT    Date: 1/4/2023 Time: 11:56 AM     Future Appointments   Date Time Provider Mario Huang   1/4/2023 12:30 PM [facility]

## 2023-01-04 NOTE — PROGRESS NOTES
[facility] COMPANY OF [address]  [phone] fax    Plan of Care/Statement of Necessity for Occupational Therapy Services    Patient name: Sara Gonzalez Start of Care: 2023   Referral source: Ariel Nelson DO : 2000    Medical Diagnosis: Ganglion cyst of dorsum of left wrist [M67.432]  Payor: [employer] / Plan: [address] [place] [address] / Product Type: Managed Care Medicaid /  Onset Date:22   Treatment Diagnosis: left wrist pain   Prior Hospitalization: see medical history Provider#: [id number]   Medications: Verified on Patient summary List    Comorbidities: high levels of blood in copper - causes tremors   Prior Level of Function: , reading    The Plan of Care and following information is based on the information from the initial evaluation. Subjective: pt is a right hand dominant, 25 y.o. female s/p removal of a ganglion cyst on the dorsal aspect of the left wrist (22) with chief c/o of limited mobility, strength, and swelling. Pt reports low-moderate levels of pain with mobility. Pt presents with one stitch still intact at the most distal part of scar, routine healing. Pt is currently limited to lifting 20# and no pushing or pulling for two weeks. Pt reports swelling at incision site after work and has been icing it to reduce sx. Pt reports that due to functional deficits, pt is unable to complete daily tasks/work tasks without compensatory methods or assistance from another person. Based on objective measurements, pt with a decrease in AROM of the left wrist in all planes compared to right. Strength testing not appropriate at this time. Pt's FM skilled are [facility]. Pt would benefit from continued OT skilled services to address functional deficits, promoting increased independence and allow pt to return to [facility].        Objective:  Edema: Circumference    Right  Left   Styolid Level  14.2 cm  14.4 cm     Scar/Wound: size, color, drainage, adhesions, location: scar dorsum of left wrist, no signs of infection, one stitch stil claudia tact    Sensation: pt reports no sensation deficits. Range of Motion:     Active     Norms Right Left   Forearm Supination 0-80 WFL WFL    Pronation 0-80 WFL WFL   Wrist Flex 0-80 92 28    Ext 0-70 75 35    Ulnar Dev 0-30 48 27    Radial Dev 0-20 28 17       Hand ROM: Pt able to make full composite fist bilaterally.     Hand Strength: not appropriate for testing at this time   Gross Grasp 3pt Pinch Lateral Pinch Tip Pinch   Right  NT NT NT NT   Left NT NT NT NT     Nine-Hole Peg Test:  Left= _22_seconds  Right=_14_seconds  Finger Opposition:  WFL    Palpation: Tenderness along incision site with slight pressure        ADLs  Feeding:        []MaxA   []ModA   []Angelina   [] CGA   []SBA   []Huber   [x]Independent  UE Dressing:       []MaxA   []ModA   [x]Angelina   [] CGA   []SBA   []Huber   []Independent  *long sleeves, zippers, bras  LE Dressing:       []MaxA   []ModA   [x]Angelina   [] CGA   []SBA   []Huber   []Independent  *pulling up pants, buttons  Grooming:       []MaxA   []ModA   [x]Angelina   [] CGA   []SBA   []Huber   []Independent  *hair  Toileting:       []MaxA   []ModA   []Angelina   [] CGA   []SBA   []Huber   [x]Independent  Bathing:       []MaxA   [x]ModA   []Angelina   [] CGA   []SBA   []Huber   []Independent  *shampooing conditioning hair, washing left side  Light Meal Prep:    []MaxA   [x]ModA   []Angelina   [] CGA   []SBA   []Huber   []Independent  *jars, heavy pots, food packages, lifting plates (has dropped many)  Household/Other: []MaxA   []ModA   [x]Angelina   [] CGA   []SBA   []Huber   []Independent  *laundry, vacuuming right handed  Driving:       []MaxA   []ModA   []Angelina   [] CGA   []SBA   []Huber   []Independent   *pt was beginning to learning how to drive prior to surgery      *Other: Job- push heavy carts, lifting mattresses, changing pillow cases      FOTO: NA    Evaluation Complexity: History LOW Complexity : Brief history review  Examination MEDIUM Complexity : 3-5 performance deficits relating to physical, cognitive , or psychosocial skils that result in activity limitations and / or participation restrictions Clinical Decision Making LOW Complexity : No comorbidities that affect functional and no verbal or physical assistance needed to complete eval tasks   Overall Complexity Rating: LOW     Problem List: Pain effecting function, Decreased range of motion, Decreased strength, Edema effecting function, Decreased ADL/functional abilities , and Decreased activity tolerance     Treatment Plan may include any combination of the following: Therapeutic exercise, Therapeutic activities, Physical agent/modality, Scar management, Manual therapy, Patient education, and ADLs/IADLs    Patient / Family readiness to learn indicated by: asking questions, trying to perform skills, and interest    Persons(s) to be included in education:   patient (P)    Barriers to Learning/Limitations: None    Patient Goal (s): To be able to wash my hair normally and get back to work.     Patient Self Reported Health Status: good    Rehabilitation Potential: good    Short Term Goals: To be accomplished in 2 weeks:  Patient will be independent in HEP for left wrist/hand ROM/stretches/strengthening to increase ROM and strength for ADL/IADL tasks. Status at Eval: not initiated    Patient will be independent in demonstrating and performing activity modification strategies to aid in success for work, self-care, meal preparation and home management tasks. Status at Eval: not initiated    Patient will be independent in demonstrating and performing edema and scar management strategies to promote healing and improve participation in ADLs/IADLs. Status at Eval: pt educated on scar massage and edema control      Long Term Goals:  To be accomplished in 4 weeks  Patient will be able to move the left wrist and hand freely through all planes of motion with no reported pain to increase participation in work tasks. Status at Eval: discomfort with mobility    Patient will increase left wrist flexion to 65 degrees and left wrist extension to 55 degrees or greater to improve performance in ADLs/IADLs. Status at Eval: flexion= 28 degrees, extension= 35 degrees    Patient will increase left wrist ulnar deviation by 10 degrees and left wrist radial deviation by 5 degrees or greater to improve pt's ability to wash her hair. Status at Eval: ulnar deviation= 27 degrees, radial deviation= 17 degrees    Frequency / Duration: Patient to be seen 2 times per week for 4 weeks:    Patient/ Caregiver education and instruction: Diagnosis, prognosis, self care   [x]  Plan of care has been reviewed with Jassi Hobson OT 1/4/2023 10:20 AM    _____________________________________________________________________    I certify that the above Therapy Services are being furnished while the patient is under my care. I agree with the treatment plan and certify that this therapy is necessary.     [de-identified] Signature:____________Date:_________TIME:________     Davonte Miller DO  ** Signature, Date and Time must be completed for valid certification **    Please sign and return to [facility]   [address] [facility]  [phone] fax

## 2023-03-21 ENCOUNTER — TELEPHONE (OUTPATIENT)
Facility: CLINIC | Age: 23
End: 2023-03-21

## 2023-03-23 DIAGNOSIS — J30.9 ALLERGIC RHINITIS, UNSPECIFIED SEASONALITY, UNSPECIFIED TRIGGER: ICD-10-CM

## 2023-03-23 DIAGNOSIS — R06.02 SHORTNESS OF BREATH: Primary | ICD-10-CM

## 2023-03-23 RX ORDER — ALBUTEROL SULFATE 90 UG/1
1 INHALANT RESPIRATORY (INHALATION)
Qty: 1 EACH | Refills: 2 | Status: SHIPPED | OUTPATIENT
Start: 2023-03-23

## 2023-03-31 ENCOUNTER — OFFICE VISIT (OUTPATIENT)
Facility: CLINIC | Age: 23
End: 2023-03-31
Payer: MEDICAID

## 2023-03-31 ENCOUNTER — HOSPITAL ENCOUNTER (OUTPATIENT)
Facility: HOSPITAL | Age: 23
Setting detail: SPECIMEN
Discharge: HOME | End: 2023-04-03

## 2023-03-31 VITALS
WEIGHT: 121.4 LBS | HEIGHT: 61 IN | OXYGEN SATURATION: 98 % | HEART RATE: 86 BPM | TEMPERATURE: 98.2 F | RESPIRATION RATE: 16 BRPM | SYSTOLIC BLOOD PRESSURE: 112 MMHG | BODY MASS INDEX: 22.92 KG/M2 | DIASTOLIC BLOOD PRESSURE: 71 MMHG

## 2023-03-31 DIAGNOSIS — R06.02 SHORTNESS OF BREATH: ICD-10-CM

## 2023-03-31 DIAGNOSIS — R78.79 HIGH BLOOD COPPER LEVEL: ICD-10-CM

## 2023-03-31 DIAGNOSIS — J30.9 ALLERGIC RHINITIS, UNSPECIFIED SEASONALITY, UNSPECIFIED TRIGGER: ICD-10-CM

## 2023-03-31 DIAGNOSIS — R06.2 WHEEZING: ICD-10-CM

## 2023-03-31 DIAGNOSIS — R53.83 OTHER FATIGUE: ICD-10-CM

## 2023-03-31 DIAGNOSIS — T78.40XA ALLERGY, INITIAL ENCOUNTER: Primary | ICD-10-CM

## 2023-03-31 LAB — LABCORP SPECIMEN COLLECTION: NORMAL

## 2023-03-31 PROCEDURE — 99214 OFFICE O/P EST MOD 30 MIN: CPT | Performed by: STUDENT IN AN ORGANIZED HEALTH CARE EDUCATION/TRAINING PROGRAM

## 2023-03-31 PROCEDURE — 99001 SPECIMEN HANDLING PT-LAB: CPT

## 2023-03-31 RX ORDER — LEVONORGESTREL / ETHINYL ESTRADIOL AND ETHINYL ESTRADIOL 150-30(84)
KIT ORAL
COMMUNITY
Start: 2023-03-25

## 2023-03-31 SDOH — ECONOMIC STABILITY: TRANSPORTATION INSECURITY
IN THE PAST 12 MONTHS, HAS LACK OF TRANSPORTATION KEPT YOU FROM MEETINGS, WORK, OR FROM GETTING THINGS NEEDED FOR DAILY LIVING?: YES

## 2023-03-31 SDOH — ECONOMIC STABILITY: FOOD INSECURITY: WITHIN THE PAST 12 MONTHS, THE FOOD YOU BOUGHT JUST DIDN'T LAST AND YOU DIDN'T HAVE MONEY TO GET MORE.: NEVER TRUE

## 2023-03-31 SDOH — ECONOMIC STABILITY: HOUSING INSECURITY
IN THE LAST 12 MONTHS, WAS THERE A TIME WHEN YOU DID NOT HAVE A STEADY PLACE TO SLEEP OR SLEPT IN A SHELTER (INCLUDING NOW)?: NO

## 2023-03-31 SDOH — ECONOMIC STABILITY: FOOD INSECURITY: WITHIN THE PAST 12 MONTHS, YOU WORRIED THAT YOUR FOOD WOULD RUN OUT BEFORE YOU GOT MONEY TO BUY MORE.: NEVER TRUE

## 2023-03-31 SDOH — ECONOMIC STABILITY: INCOME INSECURITY: HOW HARD IS IT FOR YOU TO PAY FOR THE VERY BASICS LIKE FOOD, HOUSING, MEDICAL CARE, AND HEATING?: NOT HARD AT ALL

## 2023-03-31 ASSESSMENT — PATIENT HEALTH QUESTIONNAIRE - PHQ9
SUM OF ALL RESPONSES TO PHQ QUESTIONS 1-9: 0
SUM OF ALL RESPONSES TO PHQ QUESTIONS 1-9: 0
2. FEELING DOWN, DEPRESSED OR HOPELESS: 0
SUM OF ALL RESPONSES TO PHQ QUESTIONS 1-9: 0
SUM OF ALL RESPONSES TO PHQ9 QUESTIONS 1 & 2: 0
1. LITTLE INTEREST OR PLEASURE IN DOING THINGS: 0
SUM OF ALL RESPONSES TO PHQ QUESTIONS 1-9: 0

## 2023-03-31 NOTE — PROGRESS NOTES
Uzma Palmer is a 26 y.o. presents today for   Chief Complaint   Patient presents with    Follow-up     Is someone accompanying this pt? No     Is the patient using any DME equipment during OV? No     Health Maintenance Due   Topic Date Due    COVID-19 Vaccine (1) Never done    HPV vaccine (1 - 2-dose series) Never done    HIV screen  Never done    Chlamydia/GC screen  Never done    DTaP/Tdap/Td vaccine (1 - Tdap) Never done    Pap smear  Never done         Coordination of Care:   1. \"Have you been to the ER, urgent care clinic since your last visit? Hospitalized since your last visit? \" No    2. \"Have you seen or consulted any other health care providers outside of the [address] [place] [address] since your last visit? \" No     3. For patients aged 39-70: Has the patient had a colonoscopy / FIT/ Cologuard? NA - based on age      If the patient is female:    4. For patients aged 41-77: Has the patient had a mammogram within the past 2 years? NA - based on age or sex      11. For patients aged 21-65: Has the patient had a pap smear?  Yes - no Care Gap present    Rox Parks
behavioral problems, decreased concentration, sleep disturbance and suicidal ideas. Objective:  Vitals:    03/31/23 1435   BP: 112/71   Site: Left Upper Arm   Position: Sitting   Cuff Size: Medium Adult   Pulse: 86   Resp: 16   Temp: 98.2 °F (36.8 °C)   TempSrc: Temporal   SpO2: 98%   Weight: 121 lb 6.4 oz (55.1 kg)   Height: 5' 1\" (1.549 m)         Physical Exam  Vitals reviewed. Constitutional:       Appearance: She is normal weight. HENT:      Head: Normocephalic. Nose: No congestion or rhinorrhea. Eyes:      General: No scleral icterus. Right eye: No discharge. Left eye: No discharge. Cardiovascular:      Rate and Rhythm: Normal rate and regular rhythm. Pulmonary:      Effort: Pulmonary effort is normal.      Breath sounds: Normal breath sounds. Abdominal:      General: Abdomen is flat. Palpations: Abdomen is soft. Musculoskeletal:         General: Normal range of motion. Cervical back: Normal range of motion and neck supple. No rigidity. Skin:     General: Skin is warm and dry. Neurological:      Mental Status: She is alert and oriented to person, place, and time. Gait: Gait normal.   Psychiatric:         Mood and Affect: Mood normal.         Behavior: Behavior normal.         Thought Content: Thought content normal.         Judgment: Judgment normal.         LABS     TESTS      Assessment/Plan:    1. Allergy, initial encounter  - CBC with Auto Differential; Future  - Pulmonary function test; Future    2. Wheezing  Concern pt is developing asthma  - Pulmonary function test; Future    3. High blood copper level  - Ceruloplasmin; Future  - Hepatic Function Panel; Future  - Hepatic Function Panel  - Ceruloplasmin    4. Other fatigue  - TSH; Future  - TSH    5. Shortness of breath  - Allergen Profile, Zone 2    6.  Allergic rhinitis, unspecified seasonality, unspecified trigger  - Allergen Profile, Zone 2      Lab review: orders written for new lab

## 2023-04-04 ASSESSMENT — ENCOUNTER SYMPTOMS
EYE DISCHARGE: 0
EYE REDNESS: 0
CONSTIPATION: 0
COLOR CHANGE: 0
FACIAL SWELLING: 0
BACK PAIN: 0
SHORTNESS OF BREATH: 0
ABDOMINAL PAIN: 0
VOMITING: 0
EYE ITCHING: 0
DIARRHEA: 0
EYE PAIN: 0

## 2023-04-07 LAB
ALBUMIN SERPL-MCNC: 4.6 G/DL (ref 3.9–5)
ALP SERPL-CCNC: 62 IU/L (ref 44–121)
ALT SERPL-CCNC: 9 IU/L (ref 0–32)
AST SERPL-CCNC: 17 IU/L (ref 0–40)
BASOPHILS # BLD AUTO: 0 X10E3/UL (ref 0–0.2)
BASOPHILS NFR BLD AUTO: 1 %
BILIRUB DIRECT SERPL-MCNC: <0.1 MG/DL (ref 0–0.4)
BILIRUB SERPL-MCNC: 0.2 MG/DL (ref 0–1.2)
CERULOPLASMIN SERPL-MCNC: 42.5 MG/DL (ref 19–39)
EOSINOPHIL # BLD AUTO: 0.6 X10E3/UL (ref 0–0.4)
EOSINOPHIL NFR BLD AUTO: 9 %
ERYTHROCYTE [DISTWIDTH] IN BLOOD BY AUTOMATED COUNT: 12.8 % (ref 11.7–15.4)
HCT VFR BLD AUTO: 43 % (ref 34–46.6)
HGB BLD-MCNC: 14.4 G/DL (ref 11.1–15.9)
IMM GRANULOCYTES # BLD AUTO: 0 X10E3/UL (ref 0–0.1)
IMM GRANULOCYTES NFR BLD AUTO: 0 %
LYMPHOCYTES # BLD AUTO: 2.6 X10E3/UL (ref 0.7–3.1)
LYMPHOCYTES NFR BLD AUTO: 40 %
MCH RBC QN AUTO: 29.8 PG (ref 26.6–33)
MCHC RBC AUTO-ENTMCNC: 33.5 G/DL (ref 31.5–35.7)
MCV RBC AUTO: 89 FL (ref 79–97)
MONOCYTES # BLD AUTO: 0.6 X10E3/UL (ref 0.1–0.9)
MONOCYTES NFR BLD AUTO: 9 %
NEUTROPHILS # BLD AUTO: 2.7 X10E3/UL (ref 1.4–7)
NEUTROPHILS NFR BLD AUTO: 41 %
PLATELET # BLD AUTO: 383 X10E3/UL (ref 150–450)
PROT SERPL-MCNC: 7.6 G/DL (ref 6–8.5)
RBC # BLD AUTO: 4.83 X10E6/UL (ref 3.77–5.28)
TSH SERPL DL<=0.005 MIU/L-ACNC: 1.14 UIU/ML (ref 0.45–4.5)
WBC # BLD AUTO: 6.5 X10E3/UL (ref 3.4–10.8)

## 2023-07-19 ENCOUNTER — TELEPHONE (OUTPATIENT)
Facility: CLINIC | Age: 23
End: 2023-07-19

## 2023-07-19 NOTE — TELEPHONE ENCOUNTER
----- Message from Ernesto Godoy sent at 7/14/2023  3:41 PM EDT -----  Subject: Message to Provider    QUESTIONS  Information for Provider? Pt called in and said she wanted to speak to   pcp. Pt said she has been having really bad bloating. Pt said should she   be concerned. Pt said it is all the time she said its no pain , she said   she look pregnant. Pt said it has been going on for months and she thought   she was getting fat. Pt said she is not just wants to know should she be   seen for this ?  ---------------------------------------------------------------------------  --------------  [place] Andrew INFO  [phone]; OK to leave message on voicemail  ---------------------------------------------------------------------------  --------------  SCRIPT ANSWERS  Relationship to Patient?  Self

## 2023-11-01 ENCOUNTER — TELEPHONE (OUTPATIENT)
Facility: CLINIC | Age: 23
End: 2023-11-01

## 2023-11-09 ENCOUNTER — OFFICE VISIT (OUTPATIENT)
Facility: CLINIC | Age: 23
End: 2023-11-09
Payer: MEDICAID

## 2023-11-09 VITALS
HEIGHT: 61 IN | BODY MASS INDEX: 25.64 KG/M2 | DIASTOLIC BLOOD PRESSURE: 71 MMHG | TEMPERATURE: 98.1 F | OXYGEN SATURATION: 99 % | WEIGHT: 135.8 LBS | SYSTOLIC BLOOD PRESSURE: 105 MMHG | RESPIRATION RATE: 16 BRPM | HEART RATE: 80 BPM

## 2023-11-09 DIAGNOSIS — Z11.3 ROUTINE SCREENING FOR STI (SEXUALLY TRANSMITTED INFECTION): ICD-10-CM

## 2023-11-09 DIAGNOSIS — Z11.4 SCREENING FOR HIV WITHOUT PRESENCE OF RISK FACTORS: ICD-10-CM

## 2023-11-09 DIAGNOSIS — R51.9 DAILY HEADACHE: ICD-10-CM

## 2023-11-09 DIAGNOSIS — R11.2 NAUSEA AND VOMITING, UNSPECIFIED VOMITING TYPE: ICD-10-CM

## 2023-11-09 DIAGNOSIS — R14.0 ABDOMINAL DISTENSION: Primary | ICD-10-CM

## 2023-11-09 DIAGNOSIS — R78.79 HIGH BLOOD COPPER LEVEL: ICD-10-CM

## 2023-11-09 PROCEDURE — 99214 OFFICE O/P EST MOD 30 MIN: CPT | Performed by: STUDENT IN AN ORGANIZED HEALTH CARE EDUCATION/TRAINING PROGRAM

## 2023-11-09 RX ORDER — AMITRIPTYLINE HYDROCHLORIDE 25 MG/1
25 TABLET, FILM COATED ORAL NIGHTLY
Qty: 30 TABLET | Refills: 5 | Status: SHIPPED | OUTPATIENT
Start: 2023-11-09

## 2023-11-09 ASSESSMENT — PATIENT HEALTH QUESTIONNAIRE - PHQ9
SUM OF ALL RESPONSES TO PHQ QUESTIONS 1-9: 0
2. FEELING DOWN, DEPRESSED OR HOPELESS: 0
SUM OF ALL RESPONSES TO PHQ9 QUESTIONS 1 & 2: 0
SUM OF ALL RESPONSES TO PHQ QUESTIONS 1-9: 0
1. LITTLE INTEREST OR PLEASURE IN DOING THINGS: 0

## 2023-11-09 NOTE — PROGRESS NOTES
yC Hitchcock is a 26 y.o. presents today for   Chief Complaint   Patient presents with    Nausea & Vomiting     ED follow up     Is someone accompanying this pt? No      Is the patient using any DME equipment during OV? No      Health Maintenance Due   Topic Date Due    COVID-19 Vaccine (1) Never done    HPV vaccine (1 - 2-dose series) Never done    HIV screen  Never done    Chlamydia/GC screen  Never done    Pap smear  Never done    DTaP/Tdap/Td vaccine (7 - Tdap) 06/25/2023    Flu vaccine (1) 08/01/2023         Coordination of Care:   1. \"Have you been to the ER, urgent care clinic since your last visit? Hospitalized since your last visit? \" Yes ED several times for vomiting     2. \"Have you seen or consulted any other health care providers outside of the [address] [place] [address] since your last visit? \" No     3. For patients aged 43-73: Has the patient had a colonoscopy / FIT/ Cologuard? NA - based on age      If the patient is female:    4. For patients aged 43-66: Has the patient had a mammogram within the past 2 years? NA - based on age or sex      11. For patients aged 21-65: Has the patient had a pap smear?  No    Mark Stewart CMA

## 2023-11-09 NOTE — PROGRESS NOTES
Lord Jarrell is a 26 y.o.  female and presents with    Chief Complaint   Patient presents with    Nausea & Vomiting     ED follow up           Subjective:    Since 10/28-29 she had abdominal pain. She was having nausea and vomiting. She has been having to go to the hospital on multiple occasions. She has been given Omeprazole and Zofran, but continues to have episodes w/ abd pain + N/V. She also states that even prior to the abd pain +n/v starting she was having HA. She does not feel like this has improved. Patient Active Problem List   Diagnosis    Memory impairment    Heartburn    Right knee pain    Learning disability    Idiopathic hypersomnia with long sleep time    Essential tremor    Irregular menses      Past Medical History:   Diagnosis Date    Essential tremor     Heartburn     Idiopathic hypersomnia with long sleep time     Memory impairment     Right knee pain       History reviewed. No pertinent surgical history.    Family History   Problem Relation Age of Onset    Asthma Mother     Diabetes Mother     Alcohol Abuse Father      Social History     Socioeconomic History    Marital status: Single     Spouse name: Not on file    Number of children: Not on file    Years of education: Not on file    Highest education level: Not on file   Occupational History    Not on file   Tobacco Use    Smoking status: Never    Smokeless tobacco: Never   Substance and Sexual Activity    Alcohol use: No    Drug use: No    Sexual activity: Yes     Partners: Male     Birth control/protection: Pill   Other Topics Concern    Not on file   Social History Narrative    Not on file     Social Determinants of Health     Financial Resource Strain: Low Risk  (3/31/2023)    Overall Financial Resource Strain (CARDIA)     Difficulty of Paying Living Expenses: Not hard at all   Food Insecurity: No Food Insecurity (3/31/2023)    Hunger Vital Sign     Worried About Running Out of Food in the Last Year: Never true

## 2023-11-10 LAB
BUN SERPL-MCNC: 11 MG/DL (ref 6–20)
BUN/CREAT SERPL: 16 (ref 9–23)
CALCIUM SERPL-MCNC: 9.9 MG/DL (ref 8.7–10.2)
CERULOPLASMIN SERPL-MCNC: 49.7 MG/DL (ref 19–39)
CHLORIDE SERPL-SCNC: 102 MMOL/L (ref 96–106)
CO2 SERPL-SCNC: 21 MMOL/L (ref 20–29)
CREAT SERPL-MCNC: 0.67 MG/DL (ref 0.57–1)
EGFRCR SERPLBLD CKD-EPI 2021: 127 ML/MIN/1.73
GLUCOSE SERPL-MCNC: 85 MG/DL (ref 70–99)
HIV 1+2 AB+HIV1 P24 AG SERPL QL IA: NON REACTIVE
POTASSIUM SERPL-SCNC: 4.7 MMOL/L (ref 3.5–5.2)
SODIUM SERPL-SCNC: 139 MMOL/L (ref 134–144)
SPECIMEN STATUS REPORT: NORMAL

## 2023-11-10 ASSESSMENT — ENCOUNTER SYMPTOMS
ABDOMINAL PAIN: 0
FACIAL SWELLING: 0
VOMITING: 0
EYE DISCHARGE: 0
BACK PAIN: 0
EYE ITCHING: 0
EYE REDNESS: 0
EYE PAIN: 0
CONSTIPATION: 0
COLOR CHANGE: 0
DIARRHEA: 0
SHORTNESS OF BREATH: 0

## 2023-11-13 LAB
C TRACH RRNA SPEC QL NAA+PROBE: NEGATIVE
N GONORRHOEA RRNA SPEC QL NAA+PROBE: NEGATIVE

## 2023-11-15 ENCOUNTER — TELEPHONE (OUTPATIENT)
Facility: CLINIC | Age: 23
End: 2023-11-15

## 2023-11-15 NOTE — TELEPHONE ENCOUNTER
Pt was seen on 11/9/23 and states the medication Dr. Deleon prescribed did not help and she is still vomiting.  Please call.  Thank you.

## 2023-11-17 NOTE — TELEPHONE ENCOUNTER
Stopped taking birthday control on Sunday. Has been vomiting, but started taking Elavil 50mg has not had emesis today

## 2023-11-20 LAB — COPPER SERPL-MCNC: 212 UG/DL (ref 80–158)

## 2023-12-11 DIAGNOSIS — R11.2 NAUSEA AND VOMITING, UNSPECIFIED VOMITING TYPE: ICD-10-CM

## 2023-12-11 DIAGNOSIS — R51.9 DAILY HEADACHE: ICD-10-CM

## 2023-12-11 NOTE — TELEPHONE ENCOUNTER
Patient states she is out of Amitriptyline and is calling because she is not sure if Dr. Deleon advised her to stop taking this medication?  Please call patient to advise.    If patient should continue to take this medication, she is requesting medication refill:    Amitriptyline 25 mg    Pharmacy:  [facility] - #[id number]  [address], [facility] [address]  Phone: [phone]  Fax: [phone]     Please assist. Thank you.

## 2023-12-22 RX ORDER — AMITRIPTYLINE HYDROCHLORIDE 25 MG/1
25 TABLET, FILM COATED ORAL NIGHTLY
Qty: 90 TABLET | Refills: 1 | OUTPATIENT
Start: 2023-12-22

## 2024-01-29 ENCOUNTER — TELEPHONE (OUTPATIENT)
Facility: CLINIC | Age: 24
End: 2024-01-29

## 2024-01-29 NOTE — TELEPHONE ENCOUNTER
Called pt, she was having emesis after her endoscopy. Endoscopy was WNL. Will restart the Elavil in pt. She verbalized understanding.

## 2024-01-29 NOTE — TELEPHONE ENCOUNTER
Patient states she had an Endoscopy last week and has been experiencing vomiting and bloating since procedure.  She wants to know if this is normal?    Patient requesting a return call from Dr. Deleon to discuss.  Please call.  Thank you

## 2024-03-01 ENCOUNTER — TELEPHONE (OUTPATIENT)
Facility: CLINIC | Age: 24
End: 2024-03-01

## 2024-03-01 NOTE — TELEPHONE ENCOUNTER
Prior authorization for the Proair Digihaler was denied due to coverage from her insurance. Patient has been updated.

## 2024-03-19 ENCOUNTER — OFFICE VISIT (OUTPATIENT)
Facility: CLINIC | Age: 24
End: 2024-03-19

## 2024-03-19 VITALS
DIASTOLIC BLOOD PRESSURE: 71 MMHG | RESPIRATION RATE: 12 BRPM | SYSTOLIC BLOOD PRESSURE: 106 MMHG | BODY MASS INDEX: 27.11 KG/M2 | HEIGHT: 61 IN | TEMPERATURE: 97.6 F | WEIGHT: 143.6 LBS | HEART RATE: 85 BPM | OXYGEN SATURATION: 99 %

## 2024-03-19 DIAGNOSIS — K58.1 IRRITABLE BOWEL SYNDROME WITH CONSTIPATION: Primary | ICD-10-CM

## 2024-03-19 DIAGNOSIS — G43.719 INTRACTABLE CHRONIC MIGRAINE WITHOUT AURA AND WITHOUT STATUS MIGRAINOSUS: ICD-10-CM

## 2024-03-19 DIAGNOSIS — K21.00 GASTROESOPHAGEAL REFLUX DISEASE WITH ESOPHAGITIS WITHOUT HEMORRHAGE: ICD-10-CM

## 2024-03-19 RX ORDER — PANTOPRAZOLE SODIUM 40 MG/1
40 TABLET, DELAYED RELEASE ORAL
Qty: 90 TABLET | Refills: 1 | Status: SHIPPED | OUTPATIENT
Start: 2024-03-19

## 2024-03-19 RX ORDER — SUMATRIPTAN 50 MG/1
TABLET, FILM COATED ORAL
Qty: 9 TABLET | Refills: 5 | Status: SHIPPED | OUTPATIENT
Start: 2024-03-19

## 2024-03-19 RX ORDER — TOPIRAMATE 25 MG/1
25 TABLET ORAL NIGHTLY
Qty: 60 TABLET | Refills: 3 | Status: SHIPPED | OUTPATIENT
Start: 2024-03-19

## 2024-03-19 RX ORDER — DICYCLOMINE HYDROCHLORIDE 10 MG/1
10 CAPSULE ORAL
COMMUNITY

## 2024-03-19 ASSESSMENT — ENCOUNTER SYMPTOMS
DIARRHEA: 0
VOMITING: 0
BACK PAIN: 0
EYE REDNESS: 0
COLOR CHANGE: 0
CONSTIPATION: 0
ABDOMINAL DISTENTION: 1
SHORTNESS OF BREATH: 0
FACIAL SWELLING: 0
ABDOMINAL PAIN: 0
EYE DISCHARGE: 0
EYE ITCHING: 0
EYE PAIN: 0

## 2024-03-19 ASSESSMENT — PATIENT HEALTH QUESTIONNAIRE - PHQ9
SUM OF ALL RESPONSES TO PHQ QUESTIONS 1-9: 0
SUM OF ALL RESPONSES TO PHQ QUESTIONS 1-9: 0
2. FEELING DOWN, DEPRESSED OR HOPELESS: NOT AT ALL
SUM OF ALL RESPONSES TO PHQ QUESTIONS 1-9: 0
SUM OF ALL RESPONSES TO PHQ QUESTIONS 1-9: 0
1. LITTLE INTEREST OR PLEASURE IN DOING THINGS: NOT AT ALL
SUM OF ALL RESPONSES TO PHQ9 QUESTIONS 1 & 2: 0

## 2024-03-19 NOTE — PROGRESS NOTES
Tha Sotelo is a 24 y.o.  female and presents with    Chief Complaint   Patient presents with    Discuss Medications           Subjective:    Saw GI.  She had an endoscopy and was told she had inflammation in the esophagus and in the stomach.  Was also told by the GI that her bloating and GI symptoms could be related to IBS.    Took Elavil, and helped where it stopped her from vomiting, however she felt that during taking this it made her feel down in her mood.    Having bad HA, starts off as a mild HA, she ill take medicine but then she feels like she has bad HA. Used to be in the R side f her temple, but now is in both. She +photo and phonophobia. Laying n a dark room w/o sound makes it feels better.  Mother has history of migraines.    Patient Active Problem List   Diagnosis    Memory impairment    Heartburn    Right knee pain    Learning disability    Idiopathic hypersomnia with long sleep time    Essential tremor    Irregular menses      Past Medical History:   Diagnosis Date    Asthma     Essential tremor     Heartburn     Idiopathic hypersomnia with long sleep time     Memory impairment     Right knee pain       Past Surgical History:   Procedure Laterality Date    UPPER GASTROINTESTINAL ENDOSCOPY Left 1/24/2024    EGD ESOPHAGOGASTRODUODENOSCOPY w/ cold bx performed by Jay Jay Hsu MD at [facility] ENDOSCOPY      Family History   Problem Relation Age of Onset    Asthma Mother     Diabetes Mother     Alcohol Abuse Father      Social History     Socioeconomic History    Marital status: Single     Spouse name: Not on file    Number of children: Not on file    Years of education: Not on file    Highest education level: Not on file   Occupational History    Not on file   Tobacco Use    Smoking status: Never    Smokeless tobacco: Never   Vaping Use    Vaping Use: Never used   Substance and Sexual Activity    Alcohol use: No    Drug use: No    Sexual activity: Yes     Partners: Male     Birth

## 2024-03-19 NOTE — PROGRESS NOTES
Tha Sotelo is a 24 y.o. year old female who presents today for   Chief Complaint   Patient presents with    Discuss Medications       Is someone accompanying this pt? Yes, mother     Is the patient using any DME equipment during OV? No     Depression Screening:       3/19/2024     7:45 AM 11/9/2023     8:13 AM 3/31/2023     2:32 PM 9/19/2022     7:58 AM   PHQ-9 Questionaire   Little interest or pleasure in doing things 0 0 0 0   Feeling down, depressed, or hopeless 0 0 0 0   PHQ-9 Total Score 0 0 0 0       Abuse Screening:       No data to display                Learning Assessment:  No question data found.    Fall Risk:       No data to display                    Coordination of Care:   1. \"Have you been to the ER, urgent care clinic since your last visit?  Hospitalized since your last visit?\" Yes     2. \"Have you seen or consulted any other health care providers outside of the [facility] since your last visit?\" Yes     3. For patients aged 45-75: Has the patient had a colonoscopy / FIT/ Cologuard? Not due     If the patient is female:    4. For patients aged 40-74: Has the patient had a mammogram within the past 2 years? Not due     5. For patients aged 21-65: Has the patient had a pap smear? Due     Health Maintenance: reviewed and discussed and ordered per Provider.    Health Maintenance Due   Topic Date Due    COVID-19 Vaccine (1) Never done    HPV vaccine (1 - 2-dose series) Never done    Pap smear  Never done    DTaP/Tdap/Td vaccine (7 - Tdap) 06/25/2023    Flu vaccine (1) 08/01/2023        -Bridget Rubalcava LPN  [facility]  Phone: [phone]  Fax: [phone]

## 2024-04-25 ENCOUNTER — OFFICE VISIT (OUTPATIENT)
Facility: CLINIC | Age: 24
End: 2024-04-25
Payer: MEDICARE

## 2024-04-25 VITALS
BODY MASS INDEX: 26.58 KG/M2 | TEMPERATURE: 97.3 F | SYSTOLIC BLOOD PRESSURE: 111 MMHG | HEIGHT: 61 IN | HEART RATE: 76 BPM | OXYGEN SATURATION: 98 % | RESPIRATION RATE: 10 BRPM | WEIGHT: 140.8 LBS | DIASTOLIC BLOOD PRESSURE: 76 MMHG

## 2024-04-25 DIAGNOSIS — G43.719 INTRACTABLE CHRONIC MIGRAINE WITHOUT AURA AND WITHOUT STATUS MIGRAINOSUS: Primary | ICD-10-CM

## 2024-04-25 PROCEDURE — 99214 OFFICE O/P EST MOD 30 MIN: CPT | Performed by: STUDENT IN AN ORGANIZED HEALTH CARE EDUCATION/TRAINING PROGRAM

## 2024-04-25 RX ORDER — RIMEGEPANT SULFATE 75 MG/75MG
75 TABLET, ORALLY DISINTEGRATING ORAL
Qty: 16 TABLET | Refills: 1 | Status: SHIPPED | OUTPATIENT
Start: 2024-04-25

## 2024-04-25 SDOH — ECONOMIC STABILITY: INCOME INSECURITY: HOW HARD IS IT FOR YOU TO PAY FOR THE VERY BASICS LIKE FOOD, HOUSING, MEDICAL CARE, AND HEATING?: NOT HARD AT ALL

## 2024-04-25 SDOH — ECONOMIC STABILITY: FOOD INSECURITY: WITHIN THE PAST 12 MONTHS, YOU WORRIED THAT YOUR FOOD WOULD RUN OUT BEFORE YOU GOT MONEY TO BUY MORE.: NEVER TRUE

## 2024-04-25 SDOH — ECONOMIC STABILITY: TRANSPORTATION INSECURITY
IN THE PAST 12 MONTHS, HAS LACK OF TRANSPORTATION KEPT YOU FROM MEETINGS, WORK, OR FROM GETTING THINGS NEEDED FOR DAILY LIVING?: NO

## 2024-04-25 SDOH — ECONOMIC STABILITY: FOOD INSECURITY: WITHIN THE PAST 12 MONTHS, THE FOOD YOU BOUGHT JUST DIDN'T LAST AND YOU DIDN'T HAVE MONEY TO GET MORE.: NEVER TRUE

## 2024-04-25 ASSESSMENT — ENCOUNTER SYMPTOMS
EYE REDNESS: 0
SHORTNESS OF BREATH: 0
CONSTIPATION: 0
EYE ITCHING: 0
EYE PAIN: 0
COLOR CHANGE: 0
ABDOMINAL PAIN: 0
BACK PAIN: 0
DIARRHEA: 0
EYE DISCHARGE: 0
VOMITING: 0
FACIAL SWELLING: 0

## 2024-04-25 NOTE — PROGRESS NOTES
Tha Sotelo is a 24 y.o. year old female who presents today for   Chief Complaint   Patient presents with    Follow-up       Is someone accompanying this pt? No     Is the patient using any DME equipment during OV? No     Depression Screening:       3/19/2024     7:45 AM 11/9/2023     8:13 AM 3/31/2023     2:32 PM 9/19/2022     7:58 AM   PHQ-9 Questionaire   Little interest or pleasure in doing things 0 0 0 0   Feeling down, depressed, or hopeless 0 0 0 0   PHQ-9 Total Score 0 0 0 0       Abuse Screening:       No data to display                Learning Assessment:  No question data found.    Fall Risk:       No data to display                    Coordination of Care:   1. \"Have you been to the ER, urgent care clinic since your last visit?  Hospitalized since your last visit?\" No     2. \"Have you seen or consulted any other health care providers outside of the [facility] since your last visit?\" Yes     3. For patients aged 45-75: Has the patient had a colonoscopy / FIT/ Cologuard? Not due     If the patient is female:    4. For patients aged 40-74: Has the patient had a mammogram within the past 2 years? Not due     5. For patients aged 21-65: Has the patient had a pap smear? Due     Health Maintenance: reviewed and discussed and ordered per Provider.    Health Maintenance Due   Topic Date Due    COVID-19 Vaccine (1) Never done    HPV vaccine (1 - 2-dose series) Never done    Pap smear  Never done    DTaP/Tdap/Td vaccine (7 - Tdap) 06/25/2023    Annual Wellness Visit (Medicare Advantage)  Never done        -Bridget Rubalcava LPN  [facility]  Phone: [phone]  Fax: [phone]

## 2024-04-25 NOTE — PROGRESS NOTES
Tha Sotelo is a 24 y.o.  female and presents with    Chief Complaint   Patient presents with    Follow-up           Subjective:    She has been noting that she has been exercising, and eating strawberries and has been having less bloating for the last week and a half. Feels like she has been doing well on the Pantoprazole    Stopped the Topamax, bc she continued have HA.  She has tried taking Topamax, and Elavil w/o significant relief. The Imitrex has been helping when she has migraines. Getting more than 15 headaches a month.  +photo and phonophobia. Then has to be in her room trying to sleep to make it better. She will also have eye pain. Used to see neurology.       Patient Active Problem List   Diagnosis    Memory impairment    Heartburn    Right knee pain    Learning disability    Idiopathic hypersomnia with long sleep time    Essential tremor    Irregular menses      Past Medical History:   Diagnosis Date    Asthma     Essential tremor     Heartburn     Idiopathic hypersomnia with long sleep time     Memory impairment     Right knee pain       Past Surgical History:   Procedure Laterality Date    UPPER GASTROINTESTINAL ENDOSCOPY Left 1/24/2024    EGD ESOPHAGOGASTRODUODENOSCOPY w/ cold bx performed by Jay Jay Hsu MD at [facility] ENDOSCOPY      Family History   Problem Relation Age of Onset    Asthma Mother     Diabetes Mother     Alcohol Abuse Father      Social History     Socioeconomic History    Marital status: Single     Spouse name: Not on file    Number of children: Not on file    Years of education: Not on file    Highest education level: Not on file   Occupational History    Not on file   Tobacco Use    Smoking status: Never    Smokeless tobacco: Never   Vaping Use    Vaping Use: Never used   Substance and Sexual Activity    Alcohol use: No    Drug use: No    Sexual activity: Yes     Partners: Male     Birth control/protection: Pill   Other Topics Concern    Not on file   Social

## 2024-06-28 ENCOUNTER — OFFICE VISIT (OUTPATIENT)
Facility: CLINIC | Age: 24
End: 2024-06-28
Payer: MEDICAID

## 2024-06-28 VITALS
TEMPERATURE: 98 F | DIASTOLIC BLOOD PRESSURE: 66 MMHG | HEART RATE: 87 BPM | WEIGHT: 136.4 LBS | RESPIRATION RATE: 14 BRPM | OXYGEN SATURATION: 98 % | SYSTOLIC BLOOD PRESSURE: 96 MMHG | BODY MASS INDEX: 25.75 KG/M2 | HEIGHT: 61 IN

## 2024-06-28 DIAGNOSIS — D22.9 ATYPICAL MOLE: Primary | ICD-10-CM

## 2024-06-28 DIAGNOSIS — G43.719 INTRACTABLE CHRONIC MIGRAINE WITHOUT AURA AND WITHOUT STATUS MIGRAINOSUS: ICD-10-CM

## 2024-06-28 DIAGNOSIS — S99.921A INJURY OF RIGHT FOOT, INITIAL ENCOUNTER: ICD-10-CM

## 2024-06-28 PROCEDURE — 99214 OFFICE O/P EST MOD 30 MIN: CPT | Performed by: STUDENT IN AN ORGANIZED HEALTH CARE EDUCATION/TRAINING PROGRAM

## 2024-06-28 RX ORDER — SUMATRIPTAN 50 MG/1
TABLET, FILM COATED ORAL
Qty: 9 TABLET | Refills: 5 | Status: SHIPPED | OUTPATIENT
Start: 2024-06-28

## 2024-06-28 ASSESSMENT — ENCOUNTER SYMPTOMS
VOMITING: 0
DIARRHEA: 0
FACIAL SWELLING: 0
ABDOMINAL PAIN: 0
EYE ITCHING: 0
EYE REDNESS: 0
CONSTIPATION: 0
BACK PAIN: 0
COLOR CHANGE: 0
EYE PAIN: 0
EYE DISCHARGE: 0
SHORTNESS OF BREATH: 0

## 2024-06-28 NOTE — PROGRESS NOTES
Tah Sotelo is a 24 y.o. year old female who presents today for   Chief Complaint   Patient presents with    Follow-up     8 wks       Is someone accompanying this pt? No    Is the patient using any DME equipment during OV? No    Depression Screening:       3/19/2024     7:45 AM 11/9/2023     8:13 AM 3/31/2023     2:32 PM 9/19/2022     7:58 AM   PHQ-9 Questionaire   Little interest or pleasure in doing things 0 0 0 0   Feeling down, depressed, or hopeless 0 0 0 0   PHQ-9 Total Score 0 0 0 0       Abuse Screening:       No data to display                Learning Assessment:  No question data found.    Fall Risk:       No data to display                    Coordination of Care:   1. \"Have you been to the ER, urgent care clinic since your last visit?  Hospitalized since your last visit?\" Yes    2. \"Have you seen or consulted any other health care providers outside of the [facility] since your last visit?\" No    3. For patients aged 45-75: Has the patient had a colonoscopy / FIT/ Cologuard? N/A    If the patient is female:    4. For patients aged 40-74: Has the patient had a mammogram within the past 2 years? N/A    5. For patients aged 21-65: Has the patient had a pap smear? Not Due    Health Maintenance: reviewed and discussed and ordered per Provider.    Health Maintenance Due   Topic Date Due    HPV vaccine (1 - 2-dose series) Never done    DTaP/Tdap/Td vaccine (7 - Tdap) 06/25/2023    COVID-19 Vaccine (4 - 2023-24 season) 09/01/2023    Annual Wellness Visit (Medicare Advantage)  Never done        - Kalyn Connelly CMA  [facility]  Phone: [phone]  Fax: [phone]

## 2024-06-28 NOTE — PROGRESS NOTES
Tha Sotelo is a 24 y.o.  female and presents with    Chief Complaint   Patient presents with    Follow-up     8 wks           Subjective:    She has tried taking Topamax, and Elavil w/o significant relief. The Imitrex has been helping when she has migraines. She was having more than 15 headaches a month.  The HA have improved less than 15 a month, maybe 4 a month. +photo and phonophobia. Has also now started having issues behind the R eye mostly but can be in both. Then has to be in her room trying to sleep to make it better. She will also have eye pain. Used to see neurology.        Had an injury to her R foot. Was walking at night in the dark, and hit 2 toes w/ a weight. She had swelling in the top part of the foot. She had some bruising with swelling. Feelsl ruth it is doing better now but does continue to have pain to touch.    Patient states that she is also concerned that she noted at mole to appear on her right big toe.  States that she does not think that mole was there 2 years ago, unsure if it has grown or not because she states he has not paid attention to this      Patient Active Problem List   Diagnosis    Memory impairment    Heartburn    Right knee pain    Learning disability    Idiopathic hypersomnia with long sleep time    Essential tremor    Irregular menses      Past Medical History:   Diagnosis Date    Asthma     Essential tremor     Heartburn     Idiopathic hypersomnia with long sleep time     Memory impairment     Right knee pain       Past Surgical History:   Procedure Laterality Date    UPPER GASTROINTESTINAL ENDOSCOPY Left 1/24/2024    EGD ESOPHAGOGASTRODUODENOSCOPY w/ cold bx performed by Jay Jay Hsu MD at [facility] ENDOSCOPY      Family History   Problem Relation Age of Onset    Asthma Mother     Diabetes Mother     Alcohol Abuse Father      Social History     Socioeconomic History    Marital status: Single     Spouse name: Not on file    Number of children: Not on

## 2024-10-01 ENCOUNTER — OFFICE VISIT (OUTPATIENT)
Facility: CLINIC | Age: 24
End: 2024-10-01
Payer: MEDICAID

## 2024-10-01 VITALS
DIASTOLIC BLOOD PRESSURE: 71 MMHG | SYSTOLIC BLOOD PRESSURE: 107 MMHG | RESPIRATION RATE: 14 BRPM | HEART RATE: 91 BPM | BODY MASS INDEX: 25.04 KG/M2 | WEIGHT: 132.6 LBS | OXYGEN SATURATION: 97 % | HEIGHT: 61 IN | TEMPERATURE: 98 F

## 2024-10-01 DIAGNOSIS — G43.719 INTRACTABLE CHRONIC MIGRAINE WITHOUT AURA AND WITHOUT STATUS MIGRAINOSUS: ICD-10-CM

## 2024-10-01 DIAGNOSIS — R11.2 NAUSEA AND VOMITING, UNSPECIFIED VOMITING TYPE: Primary | ICD-10-CM

## 2024-10-01 DIAGNOSIS — R21 RASH: ICD-10-CM

## 2024-10-01 PROCEDURE — 99214 OFFICE O/P EST MOD 30 MIN: CPT | Performed by: STUDENT IN AN ORGANIZED HEALTH CARE EDUCATION/TRAINING PROGRAM

## 2024-10-01 RX ORDER — ONDANSETRON 4 MG/1
4 TABLET, FILM COATED ORAL DAILY PRN
Qty: 30 TABLET | Refills: 0 | Status: SHIPPED | OUTPATIENT
Start: 2024-10-01

## 2024-10-01 RX ORDER — RIZATRIPTAN BENZOATE 10 MG/1
TABLET ORAL
Qty: 9 TABLET | Refills: 1 | Status: SHIPPED | OUTPATIENT
Start: 2024-10-01

## 2024-10-01 NOTE — PROGRESS NOTES
Tha Sotelo is a 24 y.o. year old female who presents today for   Chief Complaint   Patient presents with    3 Month Follow-Up       Is someone accompanying this pt? No    Is the patient using any DME equipment during OV? No    Depression Screening:       3/19/2024     7:45 AM 11/9/2023     8:13 AM 3/31/2023     2:32 PM 9/19/2022     7:58 AM   PHQ-9 Questionaire   Little interest or pleasure in doing things 0 0 0 0   Feeling down, depressed, or hopeless 0 0 0 0   PHQ-9 Total Score 0 0 0 0       Abuse Screening:       No data to display                Learning Assessment:  No question data found.    Fall Risk:       No data to display                    Coordination of Care:   1. \"Have you been to the ER, urgent care clinic since your last visit?  Hospitalized since your last visit?\" No    2. \"Have you seen or consulted any other health care providers outside of the [facility] since your last visit?\" No    3. For patients aged 45-75: Has the patient had a colonoscopy / FIT/ Cologuard? N/A    If the patient is female:    4. For patients aged 40-74: Has the patient had a mammogram within the past 2 years? N/A    5. For patients aged 21-65: Has the patient had a pap smear? Not Due     Health Maintenance: reviewed and discussed and ordered per Provider.    Health Maintenance Due   Topic Date Due    HPV vaccine (1 - 3-dose series) Never done    DTaP/Tdap/Td vaccine (7 - Tdap) 06/25/2023    Flu vaccine (1) 08/01/2024    COVID-19 Vaccine (4 - 2023-24 season) 09/01/2024        - Kalyn Connelly CMA  [facility]  Phone: [phone]  Fax: [phone]

## 2024-10-01 NOTE — PROGRESS NOTES
Tha Sotelo is a 24 y.o.  female and presents with    Chief Complaint   Patient presents with    3 Month Follow-Up           Subjective:    She has noted that she gets a rash on her cheeks, legs and arms prior to vomiting.  She has been more N/V. Has not been taking the Elavil, bc she feel like it makes her head feel off.  She takes the Sumatriptan, and can need up to 2 doses in order for her migraine to go away. She feels like every day for the last 2 weeks she has been having one.       She has been onamil  Patient Active Problem List   Diagnosis    Memory impairment    Heartburn    Right knee pain    Learning disability    Idiopathic hypersomnia with long sleep time    Essential tremor    Irregular menses      Past Medical History:   Diagnosis Date    Asthma     Essential tremor     Heartburn     Idiopathic hypersomnia with long sleep time     Memory impairment     Right knee pain       Past Surgical History:   Procedure Laterality Date    UPPER GASTROINTESTINAL ENDOSCOPY Left 1/24/2024    EGD ESOPHAGOGASTRODUODENOSCOPY w/ cold bx performed by Jay Jay Hsu MD at [facility] ENDOSCOPY      Family History   Problem Relation Age of Onset    Asthma Mother     Diabetes Mother     Alcohol Abuse Father      Social History     Socioeconomic History    Marital status: Single     Spouse name: Not on file    Number of children: Not on file    Years of education: Not on file    Highest education level: Not on file   Occupational History    Not on file   Tobacco Use    Smoking status: Never    Smokeless tobacco: Never   Vaping Use    Vaping status: Never Used   Substance and Sexual Activity    Alcohol use: No    Drug use: No    Sexual activity: Yes     Partners: Male     Birth control/protection: Pill   Other Topics Concern    Not on file   Social History Narrative    Not on file     Social Determinants of Health     Financial Resource Strain: Low Risk  (4/25/2024)    Overall Financial Resource Strain

## 2024-10-02 LAB
BASOPHILS # BLD AUTO: 0 X10E3/UL (ref 0–0.2)
BASOPHILS NFR BLD AUTO: 0 %
CENTROMERE B AB SER-ACNC: <0.2 AI (ref 0–0.9)
CHROMATIN AB SERPL-ACNC: <0.2 AI (ref 0–0.9)
DSDNA AB SER-ACNC: <1 IU/ML (ref 0–9)
ENA JO1 AB SER-ACNC: <0.2 AI (ref 0–0.9)
ENA RNP AB SER-ACNC: 0.2 AI (ref 0–0.9)
ENA SCL70 AB SER-ACNC: 0.3 AI (ref 0–0.9)
ENA SM AB SER-ACNC: <0.2 AI (ref 0–0.9)
ENA SS-A AB SER-ACNC: <0.2 AI (ref 0–0.9)
ENA SS-B AB SER-ACNC: <0.2 AI (ref 0–0.9)
EOSINOPHIL # BLD AUTO: 0.2 X10E3/UL (ref 0–0.4)
EOSINOPHIL NFR BLD AUTO: 3 %
ERYTHROCYTE [DISTWIDTH] IN BLOOD BY AUTOMATED COUNT: 13 % (ref 11.7–15.4)
HCT VFR BLD AUTO: 39.5 % (ref 34–46.6)
HGB BLD-MCNC: 13.1 G/DL (ref 11.1–15.9)
IMM GRANULOCYTES # BLD AUTO: 0 X10E3/UL (ref 0–0.1)
IMM GRANULOCYTES NFR BLD AUTO: 0 %
LYMPHOCYTES # BLD AUTO: 2.4 X10E3/UL (ref 0.7–3.1)
LYMPHOCYTES NFR BLD AUTO: 28 %
Lab: NORMAL
MCH RBC QN AUTO: 30.5 PG (ref 26.6–33)
MCHC RBC AUTO-ENTMCNC: 33.2 G/DL (ref 31.5–35.7)
MCV RBC AUTO: 92 FL (ref 79–97)
MONOCYTES # BLD AUTO: 0.6 X10E3/UL (ref 0.1–0.9)
MONOCYTES NFR BLD AUTO: 7 %
NEUTROPHILS # BLD AUTO: 5.3 X10E3/UL (ref 1.4–7)
NEUTROPHILS NFR BLD AUTO: 62 %
PLATELET # BLD AUTO: 300 X10E3/UL (ref 150–450)
RBC # BLD AUTO: 4.29 X10E6/UL (ref 3.77–5.28)
WBC # BLD AUTO: 8.6 X10E3/UL (ref 3.4–10.8)

## 2024-10-07 ASSESSMENT — ENCOUNTER SYMPTOMS
VOMITING: 0
ABDOMINAL PAIN: 0
EYE DISCHARGE: 0
DIARRHEA: 0
FACIAL SWELLING: 0
EYE ITCHING: 0
CONSTIPATION: 0
COLOR CHANGE: 0
EYE PAIN: 0
SHORTNESS OF BREATH: 0
BACK PAIN: 0
EYE REDNESS: 0